# Patient Record
Sex: FEMALE | Race: WHITE | Employment: FULL TIME | URBAN - METROPOLITAN AREA
[De-identification: names, ages, dates, MRNs, and addresses within clinical notes are randomized per-mention and may not be internally consistent; named-entity substitution may affect disease eponyms.]

---

## 2017-02-24 ENCOUNTER — HOSPITAL ENCOUNTER (EMERGENCY)
Age: 47
Discharge: HOME | End: 2017-02-24
Attending: EMERGENCY MEDICINE
Payer: OTHER MISCELLANEOUS

## 2017-02-24 VITALS
DIASTOLIC BLOOD PRESSURE: 79 MMHG | OXYGEN SATURATION: 98 % | RESPIRATION RATE: 16 BRPM | TEMPERATURE: 98.2 F | HEART RATE: 84 BPM | HEIGHT: 63 IN | WEIGHT: 123 LBS | BODY MASS INDEX: 21.79 KG/M2 | SYSTOLIC BLOOD PRESSURE: 136 MMHG

## 2017-02-24 DIAGNOSIS — S80.02XA CONTUSION OF LEFT KNEE, INITIAL ENCOUNTER: ICD-10-CM

## 2017-02-24 DIAGNOSIS — S43.401A SPRAIN OF RIGHT SHOULDER, UNSPECIFIED SHOULDER SPRAIN TYPE, INITIAL ENCOUNTER: ICD-10-CM

## 2017-02-24 DIAGNOSIS — S63.501A SPRAIN OF RIGHT WRIST, INITIAL ENCOUNTER: Primary | ICD-10-CM

## 2017-02-24 DIAGNOSIS — S53.401A SPRAIN OF RIGHT ELBOW, INITIAL ENCOUNTER: ICD-10-CM

## 2017-02-24 PROCEDURE — 6370000000 HC RX 637 (ALT 250 FOR IP): Performed by: EMERGENCY MEDICINE

## 2017-02-24 PROCEDURE — 99283 EMERGENCY DEPT VISIT LOW MDM: CPT

## 2017-02-24 RX ORDER — IBUPROFEN 800 MG/1
800 TABLET ORAL ONCE
Status: COMPLETED | OUTPATIENT
Start: 2017-02-24 | End: 2017-02-24

## 2017-02-24 RX ORDER — CYCLOBENZAPRINE HCL 10 MG
5 TABLET ORAL 3 TIMES DAILY PRN
Qty: 21 TABLET | Refills: 0 | Status: SHIPPED | OUTPATIENT
Start: 2017-02-24 | End: 2017-03-03

## 2017-02-24 RX ORDER — IBUPROFEN 800 MG/1
800 TABLET ORAL EVERY 8 HOURS PRN
Qty: 20 TABLET | Refills: 0 | Status: SHIPPED | OUTPATIENT
Start: 2017-02-24

## 2017-02-24 RX ADMIN — IBUPROFEN 800 MG: 800 TABLET, FILM COATED ORAL at 23:26

## 2017-02-24 ASSESSMENT — PAIN SCALES - GENERAL
PAINLEVEL_OUTOF10: 5
PAINLEVEL_OUTOF10: 5

## 2017-02-24 ASSESSMENT — PAIN DESCRIPTION - DESCRIPTORS
DESCRIPTORS: DULL;ACHING
DESCRIPTORS_2: ACHING;DULL

## 2017-02-24 ASSESSMENT — PAIN DESCRIPTION - PAIN TYPE: TYPE: ACUTE PAIN

## 2017-02-24 ASSESSMENT — PAIN DESCRIPTION - DURATION: DURATION_2: CONTINUOUS

## 2017-02-24 ASSESSMENT — PAIN DESCRIPTION - INTENSITY: RATING_2: 5

## 2017-02-24 ASSESSMENT — PAIN DESCRIPTION - ORIENTATION
ORIENTATION: RIGHT
ORIENTATION_2: LEFT

## 2017-02-24 ASSESSMENT — PAIN SCALES - WONG BAKER: WONGBAKER_NUMERICALRESPONSE: 0

## 2017-02-24 ASSESSMENT — PAIN DESCRIPTION - LOCATION
LOCATION: WRIST;SHOULDER
LOCATION_2: LEG

## 2018-04-18 ENCOUNTER — OFFICE VISIT (OUTPATIENT)
Dept: FAMILY MEDICINE CLINIC | Age: 48
End: 2018-04-18
Payer: COMMERCIAL

## 2018-04-18 VITALS
DIASTOLIC BLOOD PRESSURE: 80 MMHG | HEIGHT: 63 IN | OXYGEN SATURATION: 100 % | WEIGHT: 132.2 LBS | SYSTOLIC BLOOD PRESSURE: 138 MMHG | RESPIRATION RATE: 18 BRPM | BODY MASS INDEX: 23.42 KG/M2 | HEART RATE: 74 BPM | TEMPERATURE: 98.8 F

## 2018-04-18 DIAGNOSIS — J01.90 ACUTE BACTERIAL SINUSITIS: Primary | ICD-10-CM

## 2018-04-18 DIAGNOSIS — B96.89 ACUTE BACTERIAL SINUSITIS: Primary | ICD-10-CM

## 2018-04-18 PROCEDURE — G8427 DOCREV CUR MEDS BY ELIG CLIN: HCPCS | Performed by: NURSE PRACTITIONER

## 2018-04-18 PROCEDURE — G8420 CALC BMI NORM PARAMETERS: HCPCS | Performed by: NURSE PRACTITIONER

## 2018-04-18 PROCEDURE — 99213 OFFICE O/P EST LOW 20 MIN: CPT | Performed by: NURSE PRACTITIONER

## 2018-04-18 PROCEDURE — 4004F PT TOBACCO SCREEN RCVD TLK: CPT | Performed by: NURSE PRACTITIONER

## 2018-04-18 RX ORDER — AMOXICILLIN AND CLAVULANATE POTASSIUM 875; 125 MG/1; MG/1
1 TABLET, FILM COATED ORAL 2 TIMES DAILY
Qty: 14 TABLET | Refills: 0 | Status: SHIPPED | OUTPATIENT
Start: 2018-04-18 | End: 2018-04-25

## 2018-04-18 RX ORDER — FLUCONAZOLE 150 MG/1
150 TABLET ORAL ONCE
Qty: 2 TABLET | Refills: 0 | Status: SHIPPED | OUTPATIENT
Start: 2018-04-18 | End: 2018-04-18

## 2018-04-18 RX ORDER — FLUTICASONE PROPIONATE 50 MCG
1 SPRAY, SUSPENSION (ML) NASAL DAILY
Qty: 1 BOTTLE | Refills: 0 | Status: SHIPPED | OUTPATIENT
Start: 2018-04-18 | End: 2018-09-16

## 2018-04-18 RX ORDER — TIZANIDINE 4 MG/1
4 TABLET ORAL
COMMUNITY
Start: 2015-06-09

## 2018-04-18 ASSESSMENT — ENCOUNTER SYMPTOMS
SWOLLEN GLANDS: 0
SORE THROAT: 1
RHINORRHEA: 1
DIARRHEA: 0
CONSTIPATION: 0
ABDOMINAL PAIN: 0
CHEST TIGHTNESS: 0
ABDOMINAL DISTENTION: 0
SHORTNESS OF BREATH: 0
WHEEZING: 0
NAUSEA: 0
ANAL BLEEDING: 0
COUGH: 1
HOARSE VOICE: 0
BLOOD IN STOOL: 0
VOMITING: 0
SINUS PRESSURE: 1

## 2018-08-29 ENCOUNTER — OFFICE VISIT (OUTPATIENT)
Dept: FAMILY MEDICINE CLINIC | Age: 48
End: 2018-08-29
Payer: COMMERCIAL

## 2018-08-29 VITALS
WEIGHT: 130.2 LBS | RESPIRATION RATE: 16 BRPM | HEIGHT: 63 IN | OXYGEN SATURATION: 98 % | TEMPERATURE: 97.1 F | BODY MASS INDEX: 23.07 KG/M2 | SYSTOLIC BLOOD PRESSURE: 122 MMHG | HEART RATE: 63 BPM | DIASTOLIC BLOOD PRESSURE: 80 MMHG

## 2018-08-29 DIAGNOSIS — B36.0 TINEA VERSICOLOR: Primary | ICD-10-CM

## 2018-08-29 DIAGNOSIS — L30.4 INTERTRIGO: ICD-10-CM

## 2018-08-29 PROCEDURE — G8420 CALC BMI NORM PARAMETERS: HCPCS | Performed by: NURSE PRACTITIONER

## 2018-08-29 PROCEDURE — 99213 OFFICE O/P EST LOW 20 MIN: CPT | Performed by: NURSE PRACTITIONER

## 2018-08-29 PROCEDURE — 4004F PT TOBACCO SCREEN RCVD TLK: CPT | Performed by: NURSE PRACTITIONER

## 2018-08-29 PROCEDURE — G8427 DOCREV CUR MEDS BY ELIG CLIN: HCPCS | Performed by: NURSE PRACTITIONER

## 2018-08-29 RX ORDER — NYSTATIN 100000 U/G
CREAM TOPICAL
Qty: 30 G | Refills: 2 | Status: SHIPPED | OUTPATIENT
Start: 2018-08-29 | End: 2019-12-21

## 2018-08-29 RX ORDER — SELENIUM SULFIDE 2.5 MG/100ML
LOTION TOPICAL
Qty: 118 ML | Refills: 5 | Status: SHIPPED | OUTPATIENT
Start: 2018-08-29 | End: 2019-12-21

## 2018-08-29 ASSESSMENT — ENCOUNTER SYMPTOMS
SORE THROAT: 0
NAUSEA: 0
EYE PAIN: 0
VOMITING: 0
EYE ITCHING: 0
COUGH: 0
RHINORRHEA: 0
SHORTNESS OF BREATH: 0
EYE DISCHARGE: 0
PHOTOPHOBIA: 0
NAIL CHANGES: 0
TROUBLE SWALLOWING: 0
EYE REDNESS: 0
DIARRHEA: 0
ABDOMINAL PAIN: 0
SINUS PRESSURE: 0

## 2018-08-29 NOTE — PROGRESS NOTES
has no wheezes. She has no rales. Musculoskeletal: Normal range of motion. She exhibits no edema. Neurological: She is alert and oriented to person, place, and time. Skin: Skin is warm and dry. Rash noted. Rash is macular and papular. She is not diaphoretic. Psychiatric: She has a normal mood and affect. Her behavior is normal.   Vitals reviewed. Assessment and Plan      ICD-10-CM ICD-9-CM    1. Tinea versicolor B36.0 111.0 selenium sulfide (SELSUN) 2.5 % lotion   2. Intertrigo L30.4 695.89 nystatin (MYCOSTATIN) 977138 UNIT/GM cream     Orders Placed This Encounter   Medications    selenium sulfide (SELSUN) 2.5 % lotion     Sig: Apply for 10 minutes on skin daily for 1-4 weeks OR one overnight application. Dispense:  118 mL     Refill:  5    nystatin (MYCOSTATIN) 865652 UNIT/GM cream     Sig: Apply topically 2 times daily. Dispense:  30 g     Refill:  2       Health Maintenance  Patient declined PPSV23 during this visit    Reviewed with the patient: current clinical status, medications, activities and diet. Patient advised to practice proper hand hygiene, rest as tolerated, and increase hydration. Patient advised to take medication as directed. Advised to eat a well balanced diet. Advised to use Tylenol/NSAIDs as needed for symptom management. 1. Recommend hypoallergenic body soap and lotion (Cetaphil, CeraVe or Dove for Sensitive Skin). 2. Apply topical selsun as directed for symptom management. 3. Advised to use nystatin as directed for skin fold rash. 4. Choose laundry detergents and fabric softeners that are hypoallergenic (Tide Free or All Free & Clear)--NO ADDED DYES OR PERFUMES. Side effects, adverse effects of the medication prescribed today, as well as treatment plan and result expectations have been discussed with the patient who expresses understanding and desires to proceed with recommended treatment and action plan.      Close follow up to evaluate treatment results and for coordination of care. I have reviewed the patient's medical history in detail and updated the computerized patient record. Patient instructed to follow-up with her PCP or proceed to ER if symptoms are not resolved, persist, or worsen despite medical treatment plan initiated at today's visit.          Kori Schaffer, APRN - CNP

## 2018-08-29 NOTE — PATIENT INSTRUCTIONS
can help relieve the discomfort caused by a rash. A moisturizing lotion, such as Cetaphil, also may help. Calamine lotion may help for rashes caused by contact with something (such as a plant or soap) that irritated the skin. Use it 3 or 4 times a day. · If your doctor prescribed a cream, use it as directed. If your doctor prescribed medicine, take it exactly as directed. · If your rash itches so badly that it interferes with your normal activities, take an over-the-counter antihistamine, such as diphenhydramine (Benadryl) or loratadine (Claritin). Read and follow all instructions on the label. When should you call for help? Call your doctor now or seek immediate medical care if:    · You have signs of infection, such as:  ¨ Increased pain, swelling, warmth, or redness. ¨ Red streaks leading from the area. ¨ Pus draining from the area. ¨ A fever.     · You have joint pain along with the rash.    Watch closely for changes in your health, and be sure to contact your doctor if:    · Your rash is changing or getting worse. For example, call if you have pain along with the rash, the rash is spreading, or you have new blisters.     · You do not get better after 1 week. Where can you learn more? Go to [email or web address]?.[email or web address] org and sign in to your Student[employer]under account. Enter V307 in the [facility] box to learn more about \"Rash: Care Instructions. \"     If you do not have an account, please click on the \"Sign Up Now\" link. Current as of: October 5, 2017  Content Version: 11.7  © 5541-9262 [employer], [employer]. Care instructions adapted under license by [facility] ([facility]). If you have questions about a medical condition or this instruction, always ask your healthcare professional. [address] any warranty or liability for your use of this information.

## 2018-09-16 ENCOUNTER — OFFICE VISIT (OUTPATIENT)
Dept: FAMILY MEDICINE CLINIC | Age: 48
End: 2018-09-16
Payer: COMMERCIAL

## 2018-09-16 VITALS
WEIGHT: 132 LBS | SYSTOLIC BLOOD PRESSURE: 110 MMHG | DIASTOLIC BLOOD PRESSURE: 80 MMHG | BODY MASS INDEX: 23.38 KG/M2 | HEART RATE: 73 BPM | TEMPERATURE: 98.2 F | OXYGEN SATURATION: 98 %

## 2018-09-16 DIAGNOSIS — B96.89 BACTERIAL VAGINOSIS: Primary | ICD-10-CM

## 2018-09-16 DIAGNOSIS — T36.95XA ANTIBIOTIC-INDUCED YEAST INFECTION: ICD-10-CM

## 2018-09-16 DIAGNOSIS — N76.0 BACTERIAL VAGINOSIS: Primary | ICD-10-CM

## 2018-09-16 DIAGNOSIS — B37.9 ANTIBIOTIC-INDUCED YEAST INFECTION: ICD-10-CM

## 2018-09-16 DIAGNOSIS — R39.15 URGENCY OF URINATION: ICD-10-CM

## 2018-09-16 LAB
BILIRUBIN, POC: NORMAL
BLOOD URINE, POC: NORMAL
CLARITY, POC: NORMAL
COLOR, POC: YELLOW
GLUCOSE URINE, POC: NORMAL
KETONES, POC: NORMAL
LEUKOCYTE EST, POC: NORMAL
NITRITE, POC: NORMAL
PH, POC: 6.5
PROTEIN, POC: NORMAL
SPECIFIC GRAVITY, POC: 1.01
UROBILINOGEN, POC: NORMAL

## 2018-09-16 PROCEDURE — G8427 DOCREV CUR MEDS BY ELIG CLIN: HCPCS | Performed by: NURSE PRACTITIONER

## 2018-09-16 PROCEDURE — G8420 CALC BMI NORM PARAMETERS: HCPCS | Performed by: NURSE PRACTITIONER

## 2018-09-16 PROCEDURE — 81002 URINALYSIS NONAUTO W/O SCOPE: CPT | Performed by: NURSE PRACTITIONER

## 2018-09-16 PROCEDURE — 99213 OFFICE O/P EST LOW 20 MIN: CPT | Performed by: NURSE PRACTITIONER

## 2018-09-16 PROCEDURE — 4004F PT TOBACCO SCREEN RCVD TLK: CPT | Performed by: NURSE PRACTITIONER

## 2018-09-16 RX ORDER — FLUCONAZOLE 150 MG/1
150 TABLET ORAL ONCE
Qty: 2 TABLET | Refills: 0 | Status: SHIPPED | OUTPATIENT
Start: 2018-09-16 | End: 2018-09-16

## 2018-09-16 RX ORDER — METRONIDAZOLE 500 MG/1
500 TABLET ORAL 2 TIMES DAILY
Qty: 14 TABLET | Refills: 0 | Status: SHIPPED | OUTPATIENT
Start: 2018-09-16 | End: 2018-09-23

## 2018-09-16 ASSESSMENT — ENCOUNTER SYMPTOMS
SORE THROAT: 0
SHORTNESS OF BREATH: 0
ABDOMINAL PAIN: 0
NAUSEA: 0
CHEST TIGHTNESS: 0

## 2018-09-16 NOTE — PATIENT INSTRUCTIONS
unexpected vaginal bleeding.     · You have a fever.     · You have new or increased pain in your vagina or pelvis.     · You are not getting better after 1 week.     · Your symptoms return after you finish the course of your medicine. Where can you learn more? Go to [email or web address] [email or web address] and sign in to your [employer] account. Enter A455 in the [employer] box to learn more about \"Bacterial Vaginosis: Care Instructions. \"     If you do not have an account, please click on the \"Sign Up Now\" link. Current as of: October 6, 2017  Content Version: 11.7  © 6181-6974 [employer]. Care instructions adapted under license by [facility] ([facility]). If you have questions about a medical condition or this instruction, always ask your healthcare professional. [address] any warranty or liability for your use of this information.

## 2018-09-16 NOTE — PROGRESS NOTES
History Narrative    No narrative on file     No family history on file. Allergies   Allergen Reactions    Latex Anaphylaxis     BURNING OF SKIN    Adhesive Tape      BURNING OF SKIN    Ciprofloxacin     Codeine     Demerol Hcl [Meperidine]     Tetracyclines & Related      Current Outpatient Prescriptions   Medication Sig Dispense Refill    metroNIDAZOLE (FLAGYL) 500 MG tablet Take 1 tablet by mouth 2 times daily for 7 days To treat BV: 500mg bid x 7  To treat trich: give 500 mg x 4 at once (2g) 14 tablet 0    fluconazole (DIFLUCAN) 150 MG tablet Take 1 tablet by mouth once for 1 dose 2 tablet 0    selenium sulfide (SELSUN) 2.5 % lotion Apply for 10 minutes on skin daily for 1-4 weeks OR one overnight application. 118 mL 5    nystatin (MYCOSTATIN) 389887 UNIT/GM cream Apply topically 2 times daily. 30 g 2    tiZANidine (ZANAFLEX) 4 MG tablet Take 4 mg by mouth      atenolol (TENORMIN) 50 MG tablet Take 50 mg by mouth daily      amLODIPine (NORVASC) 2.5 MG tablet Take 2.5 mg by mouth daily      ibuprofen (ADVIL;MOTRIN) 800 MG tablet Take 1 tablet by mouth every 8 hours as needed for Pain or Fever 20 tablet 0    ibuprofen (ADVIL;MOTRIN) 800 MG tablet Take 800 mg by mouth every 6 hours as needed for Pain       No current facility-administered medications for this visit. PMH, Surgical Hx, Family Hx, and Social Hx reviewed and updated. Health Maintenance reviewed. Objective    Vitals:    09/16/18 1601   BP: 110/80   Site: Right Upper Arm   Position: Sitting   Cuff Size: Medium Adult   Pulse: 73   Temp: 98.2 °F (36.8 °C)   TempSrc: Tympanic   SpO2: 98%   Weight: 132 lb (59.9 kg)       Physical Exam   Constitutional: She is oriented to person, place, and time. She appears well-developed and well-nourished. No distress. HENT:   Head: Normocephalic and atraumatic. Mouth/Throat: Oropharynx is clear and moist.   Eyes: Conjunctivae are normal. Right eye exhibits no discharge.  Left eye exhibits no discharge. Neck: Normal range of motion. Cardiovascular: Normal rate. Pulmonary/Chest: Effort normal.   Genitourinary:   Genitourinary Comments: Deferred. Musculoskeletal: Normal range of motion. Neurological: She is alert and oriented to person, place, and time. Skin: Skin is warm and dry. No rash noted. She is not diaphoretic. Psychiatric: She has a normal mood and affect. Her behavior is normal. Judgment and thought content normal.   Nursing note and vitals reviewed. Results for POC orders placed in visit on 09/16/18   POCT Urinalysis no Micro   Result Value Ref Range    Color, UA yellow     Clarity, UA cloudy     Glucose, UA POC neg     Bilirubin, UA neg     Ketones, UA neg     Spec Grav, UA 1.010     Blood, UA POC neg     pH, UA 6.5     Protein, UA POC neg     Urobilinogen, UA neg     Leukocytes, UA neg     Nitrite, UA neg          No results found for this visit on 09/16/18. Assessment & Plan    Diagnosis Orders   1. Bacterial vaginosis  metroNIDAZOLE (FLAGYL) 500 MG tablet   2. Antibiotic-induced yeast infection  fluconazole (DIFLUCAN) 150 MG tablet   3. Urgency of urination  POCT Urinalysis no Micro    Urine Culture     Orders Placed This Encounter   Procedures    Urine Culture     Standing Status:   Future     Standing Expiration Date:   9/16/2019     Order Specific Question:   Specify (ex-cath, midstream, cysto, etc)?      Answer:   mid stream    POCT Urinalysis no Micro     Orders Placed This Encounter   Medications    metroNIDAZOLE (FLAGYL) 500 MG tablet     Sig: Take 1 tablet by mouth 2 times daily for 7 days To treat BV: 500mg bid x 7  To treat trich: give 500 mg x 4 at once (2g)     Dispense:  14 tablet     Refill:  0    fluconazole (DIFLUCAN) 150 MG tablet     Sig: Take 1 tablet by mouth once for 1 dose     Dispense:  2 tablet     Refill:  0     Medications Discontinued During This Encounter   Medication Reason    fluticasone (FLONASE) 50 MCG/ACT nasal spray    

## 2018-09-17 DIAGNOSIS — R39.15 URGENCY OF URINATION: ICD-10-CM

## 2018-09-19 LAB — URINE CULTURE, ROUTINE: NORMAL

## 2018-10-15 ENCOUNTER — OFFICE VISIT (OUTPATIENT)
Dept: FAMILY MEDICINE CLINIC | Age: 48
End: 2018-10-15
Payer: COMMERCIAL

## 2018-10-15 VITALS
DIASTOLIC BLOOD PRESSURE: 64 MMHG | RESPIRATION RATE: 14 BRPM | TEMPERATURE: 97.6 F | SYSTOLIC BLOOD PRESSURE: 118 MMHG | HEART RATE: 71 BPM | BODY MASS INDEX: 23.39 KG/M2 | WEIGHT: 132 LBS | OXYGEN SATURATION: 99 % | HEIGHT: 63 IN

## 2018-10-15 DIAGNOSIS — J01.00 ACUTE NON-RECURRENT MAXILLARY SINUSITIS: Primary | ICD-10-CM

## 2018-10-15 DIAGNOSIS — B96.89 BACTERIAL VAGINOSIS: ICD-10-CM

## 2018-10-15 DIAGNOSIS — N76.0 BACTERIAL VAGINOSIS: ICD-10-CM

## 2018-10-15 PROBLEM — J38.2 VOCAL CORD NODULE: Status: ACTIVE | Noted: 2018-08-07

## 2018-10-15 PROBLEM — R49.0 DYSPHONIA: Status: ACTIVE | Noted: 2018-08-07

## 2018-10-15 PROBLEM — M25.562 ACUTE PAIN OF LEFT KNEE: Status: ACTIVE | Noted: 2017-03-14

## 2018-10-15 PROCEDURE — G8427 DOCREV CUR MEDS BY ELIG CLIN: HCPCS | Performed by: NURSE PRACTITIONER

## 2018-10-15 PROCEDURE — 4004F PT TOBACCO SCREEN RCVD TLK: CPT | Performed by: NURSE PRACTITIONER

## 2018-10-15 PROCEDURE — 99213 OFFICE O/P EST LOW 20 MIN: CPT | Performed by: NURSE PRACTITIONER

## 2018-10-15 PROCEDURE — G8484 FLU IMMUNIZE NO ADMIN: HCPCS | Performed by: NURSE PRACTITIONER

## 2018-10-15 PROCEDURE — G8420 CALC BMI NORM PARAMETERS: HCPCS | Performed by: NURSE PRACTITIONER

## 2018-10-15 RX ORDER — CEFDINIR 300 MG/1
600 CAPSULE ORAL DAILY
Qty: 20 CAPSULE | Refills: 0 | Status: SHIPPED | OUTPATIENT
Start: 2018-10-15 | End: 2018-10-25

## 2018-10-15 RX ORDER — METRONIDAZOLE 500 MG/1
TABLET ORAL
Qty: 14 TABLET | Refills: 0 | Status: SHIPPED | OUTPATIENT
Start: 2018-10-15 | End: 2019-05-29

## 2018-10-15 RX ORDER — METRONIDAZOLE 500 MG/1
TABLET ORAL
Refills: 0 | COMMUNITY
Start: 2018-09-16 | End: 2018-10-15 | Stop reason: SDUPTHER

## 2018-10-15 ASSESSMENT — ENCOUNTER SYMPTOMS
VOMITING: 0
SINUS PRESSURE: 1
EYE REDNESS: 0
CONSTIPATION: 0
ABDOMINAL PAIN: 0
SINUS PAIN: 1
COUGH: 1
RHINORRHEA: 1
BACK PAIN: 0
WHEEZING: 0
FACIAL SWELLING: 0
SWOLLEN GLANDS: 0
STRIDOR: 0
EYE PAIN: 0
NAUSEA: 0
TROUBLE SWALLOWING: 0
DIARRHEA: 0
SORE THROAT: 1
SHORTNESS OF BREATH: 0
VOICE CHANGE: 1
EYE ITCHING: 0
CHEST TIGHTNESS: 0
PHOTOPHOBIA: 0
EYE DISCHARGE: 0

## 2018-10-15 ASSESSMENT — PATIENT HEALTH QUESTIONNAIRE - PHQ9
SUM OF ALL RESPONSES TO PHQ QUESTIONS 1-9: 0
SUM OF ALL RESPONSES TO PHQ9 QUESTIONS 1 & 2: 0
SUM OF ALL RESPONSES TO PHQ QUESTIONS 1-9: 0
2. FEELING DOWN, DEPRESSED OR HOPELESS: 0
1. LITTLE INTEREST OR PLEASURE IN DOING THINGS: 0

## 2018-10-15 NOTE — LETTER
[address]  [facility], [address]  [address] [place] [address]  Phone: [phone]  Fax: [phone] MUSA Denis - [place]        October 15, 2018     Patient: Yonas Tillman   YOB: 1970   Date of Visit: 10/15/2018       To Whom it May Concern:    Kel Blue was seen in my clinic on 10/15/2018. She can return to work on 10/17/2018. If you have any questions or concerns, please don't hesitate to call.     Sincerely,         MUSA Carrion NP

## 2018-10-15 NOTE — PROGRESS NOTES
swelling, mouth sores, sneezing, tinnitus and trouble swallowing. Eyes: Negative for photophobia, pain, discharge, redness and itching. Respiratory: Positive for cough (yellow sputum). Negative for chest tightness, shortness of breath, wheezing and stridor. Cardiovascular: Negative for chest pain. Gastrointestinal: Negative for abdominal pain, anorexia, constipation, diarrhea, nausea and vomiting. Genitourinary: Negative for difficulty urinating, dyspareunia, dysuria, flank pain, frequency, hematuria, pelvic pain, urgency, vaginal bleeding, vaginal discharge and vaginal pain. Musculoskeletal: Negative for back pain, joint pain and neck pain. Skin: Negative for rash. Allergic/Immunologic: Negative for environmental allergies. Neurological: Positive for headaches. Objective    Vitals:    10/15/18 1654   BP: 118/64   Site: Left Upper Arm   Position: Sitting   Cuff Size: Medium Adult   Pulse: 71   Resp: 14   Temp: 97.6 °F (36.4 °C)   TempSrc: Tympanic   SpO2: 99%   Weight: 132 lb (59.9 kg)   Height: 5' 3\" (1.6 m)       Physical Exam   Constitutional: She is oriented to person, place, and time. She appears well-developed and well-nourished. No distress. HENT:   Head: Normocephalic and atraumatic. Right Ear: Hearing, tympanic membrane, external ear and ear canal normal. No drainage, swelling or tenderness. No foreign bodies. No mastoid tenderness. Tympanic membrane is not perforated, not erythematous, not retracted and not bulging. No middle ear effusion. No decreased hearing is noted. Left Ear: Hearing, tympanic membrane, external ear and ear canal normal. No drainage, swelling or tenderness. No foreign bodies. No mastoid tenderness. Tympanic membrane is not perforated, not erythematous, not retracted and not bulging. No middle ear effusion. No decreased hearing is noted. Nose: Rhinorrhea present. Right sinus exhibits maxillary sinus tenderness.  Right sinus exhibits no frontal sinus tenderness. Left sinus exhibits maxillary sinus tenderness. Left sinus exhibits no frontal sinus tenderness. Mouth/Throat: Uvula is midline and mucous membranes are normal. No oral lesions. Posterior oropharyngeal erythema present. No oropharyngeal exudate or posterior oropharyngeal edema. Eyes: Conjunctivae and EOM are normal. Right eye exhibits no discharge. Left eye exhibits no discharge. Neck: Normal range of motion. Neck supple. Cardiovascular: Normal rate, regular rhythm and normal heart sounds. Pulmonary/Chest: Effort normal and breath sounds normal. No respiratory distress. She has no wheezes. She has no rales. Musculoskeletal: Normal range of motion. Lymphadenopathy:     She has no cervical adenopathy. Neurological: She is alert and oriented to person, place, and time. Coordination normal.   Skin: Skin is warm and dry. No rash noted. She is not diaphoretic. Psychiatric: She has a normal mood and affect. Her behavior is normal.   Nursing note and vitals reviewed. POC Testing Today: No results found for this visit on 10/15/18. Assessment & Plan    Diagnosis Orders   1. Acute non-recurrent maxillary sinusitis  cefdinir (OMNICEF) 300 MG capsule   2. Bacterial vaginosis  metroNIDAZOLE (FLAGYL) 500 MG tablet       No orders of the defined types were placed in this encounter. Jimbo Elizondo states she has frequent episodes of BV due to her work in a [employer] in which she has to wear a fireproof suit. States her previous OBGYN, Dr. Barb Corrales, tested her for STDs in the past and it was always negative. Has no concerns regarding STDs today. Discussed with Jimbo Elizondo that if symptoms do not resolve after this second round of treatment, she needs to have vaginal swabs repeated. Verbalized understanding. Antibiotic Instructions: Complete the full course of antibiotics as ordered. Take each dose with a small snack or meal to lessen potential GI upset.   To prevent antibiotic

## 2019-05-29 ENCOUNTER — OFFICE VISIT (OUTPATIENT)
Dept: FAMILY MEDICINE CLINIC | Age: 49
End: 2019-05-29
Payer: COMMERCIAL

## 2019-05-29 VITALS
HEART RATE: 91 BPM | TEMPERATURE: 98.5 F | RESPIRATION RATE: 14 BRPM | BODY MASS INDEX: 24.17 KG/M2 | SYSTOLIC BLOOD PRESSURE: 128 MMHG | OXYGEN SATURATION: 99 % | HEIGHT: 63 IN | WEIGHT: 136.4 LBS | DIASTOLIC BLOOD PRESSURE: 68 MMHG

## 2019-05-29 DIAGNOSIS — B37.9 ANTIBIOTIC-INDUCED YEAST INFECTION: ICD-10-CM

## 2019-05-29 DIAGNOSIS — N89.8 VAGINAL DISCHARGE: Primary | ICD-10-CM

## 2019-05-29 DIAGNOSIS — T36.95XA ANTIBIOTIC-INDUCED YEAST INFECTION: ICD-10-CM

## 2019-05-29 PROCEDURE — 99212 OFFICE O/P EST SF 10 MIN: CPT | Performed by: NURSE PRACTITIONER

## 2019-05-29 RX ORDER — RANITIDINE 150 MG/1
150 TABLET ORAL
COMMUNITY
Start: 2019-05-11 | End: 2019-12-21

## 2019-05-29 RX ORDER — ALPRAZOLAM 0.25 MG/1
TABLET ORAL
Refills: 0 | COMMUNITY
Start: 2019-05-12

## 2019-05-29 RX ORDER — METRONIDAZOLE 500 MG/1
500 TABLET ORAL 2 TIMES DAILY
Qty: 14 TABLET | Refills: 0 | Status: SHIPPED | OUTPATIENT
Start: 2019-05-29 | End: 2019-06-05

## 2019-05-29 RX ORDER — FLUCONAZOLE 150 MG/1
TABLET ORAL
Qty: 2 TABLET | Refills: 0 | Status: SHIPPED | OUTPATIENT
Start: 2019-05-29 | End: 2019-12-21

## 2019-05-29 NOTE — PATIENT INSTRUCTIONS
Call or return if symptoms don't improve in 2-3 days or worsen in any wy      Antibiotic Instructions: Complete the full course of antibiotics as ordered. Take each dose with a small snack or meal to lessen potential GI upset. To prevent antibiotic resistance, please take medication as ordered and for the full duration even if you start to feel better. Consider intake of yogurt or probiotic during antibiotic use and for a few days after to help reduce the risk of developing a secondary infection. Separate the yogurt and antibiotic by at least 1 hour. Avoid alcohol while taking antibiotics. Patient Education   Bacterial Vaginosis: Care Instructions  Your Care Instructions    Bacterial vaginosis is a type of vaginal infection. It is caused by excess growth of certain bacteria that are normally found in the vagina. Symptoms can include itching, swelling, pain when you urinate or have sex, and a gray or yellow discharge with a \"fishy\" odor. It is not considered an infection that is spread through sexual contact. Although symptoms can be annoying and uncomfortable, bacterial vaginosis does not usually cause other health problems. However, if you have it while you are pregnant, it can cause complications. While the infection may go away on its own, most doctors use antibiotics to treat it. You may have been prescribed pills or vaginal cream. With treatment, bacterial vaginosis usually clears up in 5 to 7 days. Follow-up care is a key part of your treatment and safety. Be sure to make and go to all appointments, and call your doctor if you are having problems. It's also a good idea to know your test results and keep a list of the medicines you take. How can you care for yourself at home? · Take your antibiotics as directed. Do not stop taking them just because you feel better. You need to take the full course of antibiotics.   · Do not eat or drink anything that contains alcohol if you are taking metronidazole (Flagyl). · Keep using your medicine if you start your period. Use pads instead of tampons while using a vaginal cream or suppository. Tampons can absorb the medicine. · Wear loose cotton clothing. Do not wear nylon and other materials that hold body heat and moisture close to the skin. · Do not scratch. Relieve itching with a cold pack or a cool bath. · Do not wash your vaginal area more than once a day. Use plain water or a mild, unscented soap. Do not douche. When should you call for help? Watch closely for changes in your health, and be sure to contact your doctor if:    · You have unexpected vaginal bleeding.     · You have a fever.     · You have new or increased pain in your vagina or pelvis.     · You are not getting better after 1 week.     · Your symptoms return after you finish the course of your medicine. Where can you learn more? Go to [email or web address]://[email or web address] Payments.[email or web address] org and sign in to your [employer] account. Enter F586 in the [employer] box to learn more about \"Bacterial Vaginosis: Care Instructions. \"     If you do not have an account, please click on the \"Sign Up Now\" link. Current as of: May 14, 2018  Content Version: 12.0  © 1763-3051 Healthwise, Incorporated. Care instructions adapted under license by [facility] ([facility]). If you have questions about a medical condition or this instruction, always ask your healthcare professional. [place] [address] any warranty or liability for your use of this information.

## 2019-05-29 NOTE — PROGRESS NOTES
Subjective  Yanetjennifer Johnson  Chief Complaint   Patient presents with    Vaginal Discharge     odor after sexual intercourse     47 y.o. female presents w/ complaint of vaginal discharge. Onset a few days ago - after intercourse. She describes it as mostly clear and thin, moderate amount. States it does have a fishy odor. Denies associated pain. Denies dysuria, fever, chills, or pain w/ intercourse. States symptoms are similar to past BV infections. She has one male partner - , no condom use. Denies anal intercourse or concern for STI. 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 Review of Systems   HENT: Negative for voice change (raspy/hoarse voice, not new). Gastrointestinal: Negative for abdominal pain, diarrhea, nausea and vomiting. Genitourinary: Positive for vaginal discharge. Negative for decreased urine volume, dysuria, flank pain, genital sores, pelvic pain, urgency, vaginal bleeding and vaginal pain. Musculoskeletal: Negative for back pain. Skin: Negative. Objective  Vitals:    05/29/19 1755   BP: 128/68   Site: Left Upper Arm   Position: Sitting   Cuff Size: Medium Adult   Pulse: 91   Resp: 14   Temp: 98.5 °F (36.9 °C)   TempSrc: Tympanic   SpO2: 99%   Weight: 136 lb 6.4 oz (61.9 kg)   Height: 5' 3\" (1.6 m)     Physical Exam   Constitutional: She is oriented to person, place, and time. She appears well-developed and well-nourished. She is cooperative. Non-toxic appearance. No distress. HENT:   Head: Normocephalic and atraumatic. Right Ear: Hearing and external ear normal.   Left Ear: Hearing and external ear normal.   Nose: Nose normal.   Mouth/Throat: Oropharynx is clear and moist and mucous membranes are normal.   Eyes: Pupils are equal, round, and reactive to light. EOM and lids are normal.   Neck: Normal range of motion. Neck supple. No tracheal tenderness present. No tracheal deviation present.    Cardiovascular: Normal rate, S1 normal and S2 normal.   Pulmonary/Chest: Effort normal. She has no wheezes. She has no rales. Abdominal: Soft. Normal appearance and bowel sounds are normal. She exhibits no distension. There is no hepatosplenomegaly. There is no tenderness. There is no guarding and no CVA tenderness. No hernia. Genitourinary:   Genitourinary Comments: exam declined and deferred   Musculoskeletal: Normal range of motion. Neurological: She is alert and oriented to person, place, and time. Gait normal.   Skin: Skin is warm and dry. Capillary refill takes less than 2 seconds. Psychiatric: She has a normal mood and affect. Her speech is normal and behavior is normal.   Vitals reviewed. POC Testing Today: None    Assessment & Plan   47 y.o. female presents w/ vaginal discharge similar in character to previous bacterial vaginosis infection(s). Pelvic exam and specimen collection declined and deferred, will treat based on symptoms. Pt has reliable follow-up and demonstrates good insight re: similarity of symptoms to previous episodes and absence of new/unusual gu/gyn symptoms. Diagnoses and all orders for this visit:    Vaginal discharge  -     metroNIDAZOLE (FLAGYL) 500 MG tablet; Take 1 tablet by mouth 2 times daily for 7 days  - Do not resume intercourse until symptoms have resolved/treatment complete  - Return or see PCP if symptoms do not improve as anticipated or worsen in any way -  pelvic pain/cramping, fever/chills, vaginal bleeding, urinary symptoms, abd. pain/bloating    Antibiotic-induced yeast infection  -     fluconazole (DIFLUCAN) 150 MG tablet; 1 PO STAT; repeat 1 PO in 72 hours. Antibiotic Instructions: Complete the full course of antibiotics as ordered. Take each dose with a small snack or meal to lessen potential GI upset. To prevent antibiotic resistance, please take medication as ordered and for the full duration even if you start to feel better.  Consider intake of yogurt or probiotic during antibiotic use and for a few days after to help reduce the risk of developing a secondary infection. Separate the yogurt and antibiotic by at least 1 hour. Avoid alcohol while taking antibiotics. Side effects and adverse effects of any medication prescribed today, as well as treatment plan/rationale, follow-up care, and result expectations have been discussed with the patient. Artie Maldonado expresses understanding and wishes to proceed with the plan. Discussed signs and symptoms which require immediate follow-up in ED/call to 911. Understanding verbalized. I have reviewed and updated the electronic medical record.     Quita Rinne, APRN - CNP

## 2019-06-15 ASSESSMENT — ENCOUNTER SYMPTOMS
DIARRHEA: 0
NAUSEA: 0
VOMITING: 0
BACK PAIN: 0
ABDOMINAL PAIN: 0
VOICE CHANGE: 0

## 2019-08-03 ENCOUNTER — HOSPITAL ENCOUNTER (OUTPATIENT)
Dept: WOMENS IMAGING | Age: 49
Discharge: HOME | End: 2019-08-05
Payer: COMMERCIAL

## 2019-08-03 ENCOUNTER — HOSPITAL ENCOUNTER (OUTPATIENT)
Dept: ULTRASOUND IMAGING | Age: 49
Discharge: HOME | End: 2019-08-05
Payer: COMMERCIAL

## 2019-08-03 DIAGNOSIS — R68.89 NECK SYMPTOM: ICD-10-CM

## 2019-08-03 DIAGNOSIS — R13.10 DYSPHAGIA, UNSPECIFIED TYPE: ICD-10-CM

## 2019-08-03 DIAGNOSIS — J38.3 SPASMODIC DYSPHONIA: ICD-10-CM

## 2019-08-03 DIAGNOSIS — Z12.31 ENCOUNTER FOR SCREENING MAMMOGRAM FOR BREAST CANCER: ICD-10-CM

## 2019-08-03 DIAGNOSIS — R49.0 DYSPHONIA: ICD-10-CM

## 2019-08-03 PROCEDURE — 76536 US EXAM OF HEAD AND NECK: CPT

## 2019-08-03 PROCEDURE — 77063 BREAST TOMOSYNTHESIS BI: CPT

## 2019-12-21 ENCOUNTER — OFFICE VISIT (OUTPATIENT)
Dept: FAMILY MEDICINE CLINIC | Age: 49
End: 2019-12-21
Payer: COMMERCIAL

## 2019-12-21 VITALS
TEMPERATURE: 96.8 F | OXYGEN SATURATION: 99 % | RESPIRATION RATE: 15 BRPM | HEIGHT: 62 IN | SYSTOLIC BLOOD PRESSURE: 112 MMHG | DIASTOLIC BLOOD PRESSURE: 82 MMHG | WEIGHT: 137.2 LBS | HEART RATE: 83 BPM | BODY MASS INDEX: 25.25 KG/M2

## 2019-12-21 DIAGNOSIS — B96.89 BV (BACTERIAL VAGINOSIS): Primary | ICD-10-CM

## 2019-12-21 DIAGNOSIS — R30.0 DYSURIA: ICD-10-CM

## 2019-12-21 DIAGNOSIS — N76.0 BV (BACTERIAL VAGINOSIS): Primary | ICD-10-CM

## 2019-12-21 LAB
BILIRUBIN, POC: ABNORMAL
BLOOD URINE, POC: ABNORMAL
CLARITY, POC: CLEAR
COLOR, POC: YELLOW
GLUCOSE URINE, POC: ABNORMAL
KETONES, POC: ABNORMAL
LEUKOCYTE EST, POC: ABNORMAL
NITRITE, POC: ABNORMAL
PH, POC: 6
PROTEIN, POC: ABNORMAL
SPECIFIC GRAVITY, POC: 1.02
UROBILINOGEN, POC: ABNORMAL

## 2019-12-21 PROCEDURE — 81003 URINALYSIS AUTO W/O SCOPE: CPT | Performed by: NURSE PRACTITIONER

## 2019-12-21 PROCEDURE — 99213 OFFICE O/P EST LOW 20 MIN: CPT | Performed by: NURSE PRACTITIONER

## 2019-12-21 RX ORDER — METRONIDAZOLE 500 MG/1
500 TABLET ORAL 2 TIMES DAILY
Qty: 14 TABLET | Refills: 0 | Status: SHIPPED | OUTPATIENT
Start: 2019-12-21 | End: 2019-12-28

## 2019-12-21 RX ORDER — NITROFURANTOIN 25; 75 MG/1; MG/1
100 CAPSULE ORAL 2 TIMES DAILY
Qty: 10 CAPSULE | Refills: 0 | Status: SHIPPED | OUTPATIENT
Start: 2019-12-21 | End: 2019-12-26

## 2019-12-23 LAB — URINE CULTURE, ROUTINE: NORMAL

## 2020-03-05 ENCOUNTER — HOSPITAL ENCOUNTER (EMERGENCY)
Age: 50
Discharge: HOME | End: 2020-03-05
Attending: EMERGENCY MEDICINE
Payer: COMMERCIAL

## 2020-03-05 VITALS
BODY MASS INDEX: 23.39 KG/M2 | HEART RATE: 57 BPM | DIASTOLIC BLOOD PRESSURE: 92 MMHG | RESPIRATION RATE: 17 BRPM | HEIGHT: 63 IN | OXYGEN SATURATION: 100 % | TEMPERATURE: 97.4 F | SYSTOLIC BLOOD PRESSURE: 159 MMHG | WEIGHT: 132 LBS

## 2020-03-05 PROCEDURE — 2500000003 HC RX 250 WO HCPCS: Performed by: EMERGENCY MEDICINE

## 2020-03-05 PROCEDURE — 99282 EMERGENCY DEPT VISIT SF MDM: CPT

## 2020-03-05 PROCEDURE — 6370000000 HC RX 637 (ALT 250 FOR IP): Performed by: EMERGENCY MEDICINE

## 2020-03-05 RX ORDER — TOBRAMYCIN AND DEXAMETHASONE 3; 1 MG/ML; MG/ML
1 SUSPENSION/ DROPS OPHTHALMIC
Qty: 1 BOTTLE | Refills: 0 | Status: SHIPPED | OUTPATIENT
Start: 2020-03-05 | End: 2020-03-15

## 2020-03-05 RX ORDER — PROPARACAINE HYDROCHLORIDE 5 MG/ML
2 SOLUTION/ DROPS OPHTHALMIC ONCE
Status: COMPLETED | OUTPATIENT
Start: 2020-03-05 | End: 2020-03-05

## 2020-03-05 RX ORDER — OXYCODONE HYDROCHLORIDE AND ACETAMINOPHEN 5; 325 MG/1; MG/1
1 TABLET ORAL ONCE
Status: COMPLETED | OUTPATIENT
Start: 2020-03-05 | End: 2020-03-05

## 2020-03-05 RX ADMIN — OXYCODONE HYDROCHLORIDE AND ACETAMINOPHEN 1 TABLET: 5; 325 TABLET ORAL at 08:59

## 2020-03-05 RX ADMIN — PROPARACAINE HYDROCHLORIDE 2 DROP: 5 SOLUTION/ DROPS OPHTHALMIC at 09:00

## 2020-03-05 RX ADMIN — FLUORESCEIN SODIUM 1 MG: 1 STRIP OPHTHALMIC at 09:29

## 2020-03-05 ASSESSMENT — PAIN DESCRIPTION - DESCRIPTORS: DESCRIPTORS: BURNING

## 2020-03-05 ASSESSMENT — ENCOUNTER SYMPTOMS
SORE THROAT: 0
EYE REDNESS: 1
VOMITING: 0
DIARRHEA: 0
COUGH: 0
SHORTNESS OF BREATH: 0
ABDOMINAL PAIN: 0
BACK PAIN: 0
EYE PAIN: 1
NAUSEA: 0

## 2020-03-05 ASSESSMENT — PAIN SCALES - GENERAL
PAINLEVEL_OUTOF10: 10
PAINLEVEL_OUTOF10: 10

## 2020-03-05 ASSESSMENT — PAIN DESCRIPTION - LOCATION: LOCATION: EYE

## 2020-03-05 ASSESSMENT — PAIN DESCRIPTION - PAIN TYPE: TYPE: ACUTE PAIN

## 2020-03-05 ASSESSMENT — PAIN DESCRIPTION - ORIENTATION: ORIENTATION: RIGHT;LEFT

## 2020-03-05 NOTE — ED PROVIDER NOTES
[facility] ED  eMERGENCYdEPARTMENT eNCOUnter      Pt Name: Napoleon Carrero  MRN: [id number]  [place] 1970  Date of evaluation: 3/5/2020  Tierra Rothman MD    CHIEF COMPLAINT           HPI  Napoleon Carrero is a 47 y.o. female per chart review has a h/o HTN, NIMO, Hpl, MVP presents to the ED with L eye burning, blurry vision x 30 min. Pt notes gradual onset, moderate, constant, burning pain in L eye x 30 min. +Blurry vision in peripheral vision. Pt denies fever, n/v, cp, sob, dysuria, diarrhea. ROS  Review of Systems   Constitutional: Negative for activity change, chills and fever. HENT: Negative for ear pain and sore throat. Eyes: Positive for pain, redness and visual disturbance. Respiratory: Negative for cough and shortness of breath. Cardiovascular: Negative for chest pain, palpitations and leg swelling. Gastrointestinal: Negative for abdominal pain, diarrhea, nausea and vomiting. Genitourinary: Negative for dysuria. Musculoskeletal: Negative for back pain. Skin: Negative for rash. Neurological: Negative for dizziness and weakness. Except as noted above the remainder of the review of systems was reviewed and negative.        PAST MEDICAL HISTORY     Past Medical History:   Diagnosis Date    Hypertension          SURGICAL HISTORY       Past Surgical History:   Procedure Laterality Date     SECTION      OTHER SURGICAL HISTORY Bilateral     breast augmentation    UPPP UVULOPALATOPHARYGOPLASTY           CURRENTMEDICATIONS       Previous Medications    ALPRAZOLAM (XANAX) 0.25 MG TABLET    TAKE 1 TABLET BY MOUTH EVERY DAY AN NEEDED FOR ANXIETY/PANIC ATTACK    AMLODIPINE (NORVASC) 2.5 MG TABLET    Take 2.5 mg by mouth daily    ATENOLOL (TENORMIN) 50 MG TABLET    Take 50 mg by mouth daily    IBUPROFEN (ADVIL;MOTRIN) 800 MG TABLET    Take 800 mg by mouth every 6 hours as needed for Pain    IBUPROFEN (ADVIL;MOTRIN) 800 MG TABLET    Take 1 tablet by mouth Head: Normocephalic. Right Ear: External ear normal.      Left Ear: External ear normal.   Eyes:      Pupils: Pupils are equal, round, and reactive to light. Comments: +L eye conjunctival injection. Tetracaine drops provided mild relief to L eye. Fluorescein used which shows small corneal abrasion lateral to L lateral iris. OD 20/20. OS 20/800. OU 20/200. Pt without contacts in. Neck:      Musculoskeletal: Normal range of motion and neck supple. Cardiovascular:      Rate and Rhythm: Normal rate and regular rhythm. Heart sounds: Normal heart sounds. Pulmonary:      Effort: Pulmonary effort is normal.      Breath sounds: Normal breath sounds. Abdominal:      General: Bowel sounds are normal. There is no distension. Palpations: Abdomen is soft. Tenderness: There is no abdominal tenderness. Musculoskeletal: Normal range of motion. Skin:     General: Skin is warm and dry. Neurological:      Mental Status: She is alert and oriented to person, place, and time. Psychiatric:         Mood and Affect: Mood normal.           MDM  48 yo female presents to the ED with L eye burning pain, blurry vision. Pt given PO percocet with moderate relief. Pt reassessed and feeling a little better with tetracaine. Unsure of etiology of blurry vision. Potential etiologies include iritis, acute angle glaucoma, hyphema. Given significant blurry vision, case discussed with Dr. Thaddeus Rebolledo at [facility] who recommended DC and f/u immediately. Pt wears contacts. Pt given prescription for tobramycin eye drops and will be discharged to [facility] eye. Pt understands plan. FINAL IMPRESSION      1. Corneal abrasion, unspecified laterality, initial encounter    2. Pain around left eye    3.  Blurry vision, left eye          DISPOSITION/PLAN   DISPOSITION Decision To Discharge 03/05/2020 09:44:08 AM        DISCHARGE MEDICATIONS:  [unfilled]         Kevin Babin MD(electronically signed)  Attending Emergency Physician            Shane Montemayor MD  03/05/20 [id number]

## 2020-03-05 NOTE — ED TRIAGE NOTES
Pt comes to er with c/o burning to  Left eye this morning. Pt states that she put her contact in and everything went blurry. Pt still very  Sensitive to light. Eye is red but pupils equal and reactive.  Pt also states that she uses meaningful beauty under her eyes for the  First time today and wonders if some  May have gotten in her reye

## 2021-07-12 ENCOUNTER — OFFICE VISIT (OUTPATIENT)
Dept: OBGYN CLINIC | Age: 51
End: 2021-07-12
Payer: COMMERCIAL

## 2021-07-12 VITALS
HEART RATE: 104 BPM | SYSTOLIC BLOOD PRESSURE: 122 MMHG | DIASTOLIC BLOOD PRESSURE: 60 MMHG | BODY MASS INDEX: 24.27 KG/M2 | WEIGHT: 137 LBS | HEIGHT: 63 IN

## 2021-07-12 DIAGNOSIS — Z12.31 VISIT FOR SCREENING MAMMOGRAM: ICD-10-CM

## 2021-07-12 DIAGNOSIS — Z11.51 SCREENING FOR HPV (HUMAN PAPILLOMAVIRUS): ICD-10-CM

## 2021-07-12 DIAGNOSIS — N92.4 ABNORMAL PERIMENOPAUSAL BLEEDING: ICD-10-CM

## 2021-07-12 DIAGNOSIS — Z01.419 VISIT FOR GYNECOLOGIC EXAMINATION: Primary | ICD-10-CM

## 2021-07-12 PROCEDURE — 99386 PREV VISIT NEW AGE 40-64: CPT | Performed by: OBSTETRICS & GYNECOLOGY

## 2021-07-12 NOTE — PROGRESS NOTES
Postmenopausal Annual    Subjective:     Brigitte Magallon is a 45y.o. year old female    female who presents today for her annual well woman exam.  The patient is sexually active. The patient is not currently taking hormone replacement therapy. Cyclic bleeding is reported and is irregular bleeding. The patient has regular exercise: no    Vitals:  /60 (Site: Left Upper Arm, Position: Sitting, Cuff Size: Medium Adult)   Pulse 104   Ht 5' 3\" (1.6 m)   Wt 137 lb (62.1 kg)   LMP 2021 (Approximate)   BMI 24.27 kg/m²   Allergies:  Latex, Adhesive tape, Ciprofloxacin, Codeine, Demerol hcl [meperidine], and Tetracyclines & related  Past Medical History:   Diagnosis Date    Hypertension      Past Surgical History:   Procedure Laterality Date     SECTION      OTHER SURGICAL HISTORY Bilateral     breast augmentation    UPPP UVULOPALATOPHARYGOPLASTY       No family history on file. Social History     Socioeconomic History    Marital status:      Spouse name: Not on file    Number of children: Not on file    Years of education: Not on file    Highest education level: Not on file   Occupational History    Not on file   Tobacco Use    Smoking status: Current Every Day Smoker     Packs/day: 1.00     Years: 30.00     Pack years: 30.00    Smokeless tobacco: Never Used   Substance and Sexual Activity    Alcohol use: Yes     Alcohol/week: 0.0 standard drinks     Comment: rare    Drug use: No    Sexual activity: Never     Partners: Male   Other Topics Concern    Not on file   Social History Narrative    Not on file     Social Determinants of Health     Financial Resource Strain:     Difficulty of Paying Living Expenses:    Food Insecurity:     Worried About Running Out of Food in the Last Year:     [address] in the Last Year:    Transportation Needs:     Lack of Transportation (Medical):      Lack of Transportation (Non-Medical):    Physical Activity:     Days of Exercise per Week:     Minutes of Exercise per Session:    Stress:     Feeling of Stress :    Social Connections:     Frequency of Communication with Friends and Family:     Frequency of Social Gatherings with Friends and Family:     Attends [employer] Services:     Active Member of Clubs or Organizations:     Attends Club or Organization Meetings:     Marital Status:    Intimate Partner Violence:     Fear of Current or Ex-Partner:     Emotionally Abused:     Physically Abused:     Sexually Abused:          Last mammogram 2 yrs   Breast cancer risk factors : mother 65 yo , daughter 26 yo . Last Pap 2017 wnl     Patient's last menstrual period was 07/05/2021 (approximate). Age at menopause onset: 11/2020 , after that continued with irregular vaginal bleeding. Menopausal symptom assessment:  Vasomotor symptoms: \" all the time \"   Urinary incontinence &  symptoms: denies   Sexual dysfunction: denies   Present Hormonalmedications: denies     Osteoporosis risk assessment : denies   Last bone mineral density : denies     History of abnormal lipids: no  Hypertension yes - on meds. ( tenormin and norvasc) . Stroke/MI no    Yearly flu vaccine recommended for persons aged 48 and older. Colonoscopy declines. FH colon cancer : father,. Review of Systems  Review of Systems  All other systems reviewed and are negative. Review of Systems   Constitutional: Negative for chills and fever. Respiratory: Negative for cough and shortness of breath. Cardiovascular: Negative for chest painand palpitations. Gastrointestinal: Negative for nausea and vomiting. Genitourinary: Negative for dysuria, frequency and urgency. Musculoskeletal: Negative formyalgias. Neurological: Negative for dizziness, seizures and headaches.    Psychiatric/Behavioral: Negative for depression and suicidal ideas.     :     Vitals:  /60 (Site: Left Upper Arm, Position: Sitting, Cuff Size: Medium Adult) Pulse 104   Ht 5' 3\" (1.6 m)   Wt 137 lb (62.1 kg)   LMP 07/05/2021 (Approximate)   BMI 24.27 kg/m²     Physical Exam  Physical Exam    Constitutional: She is oriented to person, place, and time and well-developed, well-nourished, and in nodistress. HENT:   Head: Normocephalic and atraumatic. Eyes: Conjunctivae are normal. Pupils are equal, round, and reactive to light. Neck: Normal range ofmotion. Neck supple. Cardiovascular: Normal rate and regular rhythm. Pulmonary/Chest: Effort normal and breath sounds normal. No respiratory distress. Right breast exhibits noinverted nipple, no mass, no nipple discharge, no skin change and no tenderness. Left breast exhibits no inverted nipple, no mass, no nipple discharge, no skin change and no tenderness. Breasts are symmetrical.   Abdominal: Soft. Bowel sounds are normal.   Genitourinary: Vagina normal, uterus normal and cervix normal. No vaginal discharge found. Musculoskeletal: Normal range ofmotion. Neurological: She is alert and oriented to person, place, and time. She has normal reflexes. Psychiatric: Memory, affect andjudgment normal.       Assessment:      Diagnosis Orders   1. Visit for gynecologic examination  PAP SMEAR   2. Screening for HPV (human papillomavirus)  PAP SMEAR   3. Visit for screening mammogram  JHONNY DIGITAL SCREEN W OR WO CAD BILATERAL   4. Abnormal perimenopausal bleeding  Follicle Stimulating Hormone    US NON OB TRANSVAGINAL    US PELVIS COMPLETE       Body mass index is 24.27 kg/m². Obesity:  Normal weight  Smoking:  yes    Plan:   PAP SMEAR : indicated:  performed.   Breast exam : Normal       Obesity Counseling:  Given  Smoking Counseling: GIVEN     Orders Placed This Encounter   Procedures    JHONNY DIGITAL SCREEN W OR WO CAD BILATERAL     Standing Status:   Future     Standing Expiration Date:   9/12/2022     Order Specific Question:   Reason for exam:     Answer:   screening    US NON OB TRANSVAGINAL     Standing Status: Future     Standing Expiration Date:   7/12/2022    US PELVIS COMPLETE     Standing Status:   Future     Standing Expiration Date:   7/12/2022     Order Specific Question:   Reason for exam:     Answer:   AUB    PAP SMEAR     Standing Status:   Future     Standing Expiration Date:   7/12/2022     Order Specific Question:   Collection Type     Answer: Thin Prep     Order Specific Question:   Prior Abnormal Pap Test     Answer:   No     Order Specific Question:   Screening or Diagnostic     Answer:   Screening     Order Specific Question:   HPV Requested? Answer:   Yes     Order Specific Question:   High Risk Patient     Answer:   N/A    Follicle Stimulating Hormone     Standing Status:   Future     Standing Expiration Date:   7/12/2022       No orders of the defined types were placed in this encounter. Follow up:  Return in about 2 weeks (around 7/26/2021). Maxine Hopper MD        HEALTH MAINTENANCE:   Health information given. Women's Health patient information and counselling done. regarding risk/benefits/alternatives to Hormone Therapy and need for yearly re-evaluation. Periodic Pap smear discussed. Mammogram recommended yearly. Colon cancer screening by age 48 & every 5-10years. Bone mineral density (by age 72 or sooner if indication it would affect Hormone Therapy management or other risk factors for osteoporosis). Liza Menchaca M.D., F.A.C.O. G

## 2021-07-18 LAB — PAP SMEAR: NORMAL

## 2022-05-27 ENCOUNTER — OFFICE VISIT (OUTPATIENT)
Dept: PAIN MANAGEMENT | Age: 52
End: 2022-05-27
Payer: COMMERCIAL

## 2022-05-27 DIAGNOSIS — M79.602 LEFT ARM PAIN: Primary | ICD-10-CM

## 2022-05-27 PROCEDURE — 95911 NRV CNDJ TEST 9-10 STUDIES: CPT | Performed by: PHYSICAL MEDICINE & REHABILITATION

## 2022-05-27 PROCEDURE — 95886 MUSC TEST DONE W/N TEST COMP: CPT | Performed by: PHYSICAL MEDICINE & REHABILITATION

## 2022-05-27 NOTE — PROGRESS NOTES
Electromyography (EMG)/Nerve conduction studies (NCS) Report: Upper Extremities    Name: Genevieve Martel   : 1970  Date: 2022   Physician: Vickie Wynn MD        INDICATIONS: Genevieve Martel is a 47 y.o. female who presents for electrodiagnostic evaluation for bilateral cervical radicular pain by request of [facility]. Her main symptom for evaluation is left arm pain. She is ambidextrous. Both limbs are necessary to examine in order to evaluate for any evidence of systemic disease as well as establish normal baseline values from which to compare any abnormal unilateral findings. The study is explained and verbal consent to proceed is obtained. NERVE CONDUCTION STUDIES:  Sensory nerve conduction studies: Bilateral median sensory nerve conduction studies to the second digit demonstrate normal peak latencies and amplitudes. Bilateral ulnar sensory nerve conduction studies to the fifth digit demonstrate normal peak latencies and amplitudes. Bilateral radial sensory nerve conduction studies to the base of the thumb demonstrate normal peak latencies and amplitudes. Bilateral upper limb temperatures are normal.     Motor nerve conduction studies: Bilateral median motor nerve conduction studies with pickup over the abductor pollicis brevis demonstrate normal distal latencies and amplitudes. Bilateral ulnar motor nerve conduction studies with pickup over the abductor digiti minimi demonstrate normal distal latencies and amplitudes. ELECTROMYOGRAPHY: A disposable monopolar needle is used to evaluate the left deltoid, biceps, triceps, pronator teres, brachioradialis, extensor indicis proprius, first dorsal interosseous, and opponens pollicis. Left pronator teres demonstrates increased insertional activity with trace abnormal spontaneous activity. Multiple sampling of the left biceps and triceps are free of any clear abnormal spontaneous activity.  Left anconeus is also sampled without any abnormal spontaneous activity. All of the other muscles sampled are free of any increased insertional activity or any abnormal spontaneous activity. Left deltoid demonstrates normal motor unit recruitment characteristics with amplitudes in the 3 to 5 mV range. Left biceps demonstrates normal motor unit recruitment characteristics with amplitudes in the 2 to 4 mV range. Left triceps demonstrates normal motor unit recruitment characteristics with amplitudes in the 4 to 6 mV range. Motor unit recruitment in all other muscles is unremarkable. The right deltoid, biceps, triceps, brachioradialis, extensor indicis proprius, first dorsal interosseous, and opponens pollicis are also sampled. All of the muscles sampled are free of any increased insertional activity or any abnormal spontaneous activity. Motor unit recruitment is unremarkable. Bilateral mid cervical paraspinal muscle sampling is free of any increased insertional activity or any abnormal spontaneous activity. SUMMARY:  This study is limited, but normal:  1. There is no clear electrodiagnostic evidence for an active bilateral cervical motor radiculopathy as clinically questioned. There are isolated changes in a single Left C6-C7 muscle that are unable to be localized or confirmed given normal electromyography findings in other left arm muscles. 2. There is no current evidence for a bilateral median mononeuropathy at the wrist or generalized large fiber sensorimotor peripheral polyneuropathy. RECOMMENDATIONS: Today's study does not clearly explain the patient's left arm pain. The patient should follow up with [facility] as previously instructed. If her symptoms persist or worsen, further electrodiagnostic evaluation may be considered if the patient is agreeable. Clinical correlation is recommended.

## 2022-07-26 ENCOUNTER — OFFICE VISIT (OUTPATIENT)
Dept: FAMILY MEDICINE CLINIC | Age: 52
End: 2022-07-26
Payer: COMMERCIAL

## 2022-07-26 VITALS
RESPIRATION RATE: 14 BRPM | HEIGHT: 63 IN | TEMPERATURE: 97.4 F | SYSTOLIC BLOOD PRESSURE: 110 MMHG | DIASTOLIC BLOOD PRESSURE: 68 MMHG | OXYGEN SATURATION: 99 % | BODY MASS INDEX: 24.98 KG/M2 | WEIGHT: 141 LBS | HEART RATE: 74 BPM

## 2022-07-26 DIAGNOSIS — R50.9 FEVER, UNSPECIFIED FEVER CAUSE: ICD-10-CM

## 2022-07-26 DIAGNOSIS — U07.1 COVID-19: Primary | ICD-10-CM

## 2022-07-26 LAB
INFLUENZA A ANTIBODY: NORMAL
INFLUENZA B ANTIBODY: NORMAL
Lab: ABNORMAL
PERFORMING INSTRUMENT: ABNORMAL
QC PASS/FAIL: ABNORMAL
SARS-COV-2, POC: DETECTED

## 2022-07-26 PROCEDURE — 87426 SARSCOV CORONAVIRUS AG IA: CPT | Performed by: NURSE PRACTITIONER

## 2022-07-26 PROCEDURE — 87804 INFLUENZA ASSAY W/OPTIC: CPT | Performed by: NURSE PRACTITIONER

## 2022-07-26 PROCEDURE — 99213 OFFICE O/P EST LOW 20 MIN: CPT | Performed by: NURSE PRACTITIONER

## 2022-07-26 PROCEDURE — G8420 CALC BMI NORM PARAMETERS: HCPCS | Performed by: NURSE PRACTITIONER

## 2022-07-26 PROCEDURE — 3017F COLORECTAL CA SCREEN DOC REV: CPT | Performed by: NURSE PRACTITIONER

## 2022-07-26 PROCEDURE — G8427 DOCREV CUR MEDS BY ELIG CLIN: HCPCS | Performed by: NURSE PRACTITIONER

## 2022-07-26 PROCEDURE — 4004F PT TOBACCO SCREEN RCVD TLK: CPT | Performed by: NURSE PRACTITIONER

## 2022-07-26 ASSESSMENT — ENCOUNTER SYMPTOMS
DIARRHEA: 0
VOMITING: 0
SHORTNESS OF BREATH: 0
SORE THROAT: 0
WHEEZING: 0
NAUSEA: 1
RHINORRHEA: 0
COUGH: 0

## 2022-07-26 NOTE — PATIENT INSTRUCTIONS
Out of [place] Sunday as long as no fever or chills and getting better     Return if symptoms worsen or fail to improve. Will need quarantine until symptom improvement or 5 days from of onset of symptoms. Discussed OTC comfort care. Discussed hydration and self proning for coughing or SOB. Pt to f/u if not improving as expected or to Er if symptoms severe. Spent much time educating the patient on COVID and answering questions. For symptomatic immunocompetent patients with mild disease who are cared for at home, isolation can usually be discontinued when the following criteria are met:  ? At least five days have passed since symptoms first appeared AND  ? At least one day (24 hours) has passed since resolution of fever without the use of fever-reducing medications AND   ? There is improvement in symptoms (eg, cough, shortness of breath)  After discontinuing home isolation, patients should continue to wear a well-fitting mask around others. The total duration of isolation plus strict masking is 10 days. During this time, patients should avoid people who are immunocompromised or at high risk for severe disease. Additional information on restrictions (eg, travel) after the five-day isolation period can be found on the [employer] website.

## 2022-08-05 ASSESSMENT — ENCOUNTER SYMPTOMS: EYE DISCHARGE: 1

## 2023-03-14 ENCOUNTER — HOSPITAL ENCOUNTER (OUTPATIENT)
Dept: WOMENS IMAGING | Age: 53
Discharge: HOME | End: 2023-03-16
Payer: COMMERCIAL

## 2023-03-14 DIAGNOSIS — Z12.31 ENCOUNTER FOR SCREENING MAMMOGRAM FOR BREAST CANCER: ICD-10-CM

## 2023-03-14 PROCEDURE — 77067 SCR MAMMO BI INCL CAD: CPT

## 2023-06-26 ENCOUNTER — OFFICE VISIT (OUTPATIENT)
Dept: CARDIOLOGY CLINIC | Age: 53
End: 2023-06-26
Payer: COMMERCIAL

## 2023-06-26 VITALS
HEART RATE: 77 BPM | SYSTOLIC BLOOD PRESSURE: 136 MMHG | OXYGEN SATURATION: 98 % | HEIGHT: 63 IN | DIASTOLIC BLOOD PRESSURE: 90 MMHG | WEIGHT: 123 LBS | BODY MASS INDEX: 21.79 KG/M2

## 2023-06-26 DIAGNOSIS — I10 ESSENTIAL HYPERTENSION: ICD-10-CM

## 2023-06-26 DIAGNOSIS — R06.09 DOE (DYSPNEA ON EXERTION): ICD-10-CM

## 2023-06-26 DIAGNOSIS — R07.2 PRECORDIAL PAIN: ICD-10-CM

## 2023-06-26 DIAGNOSIS — R00.2 PALPITATIONS: Primary | ICD-10-CM

## 2023-06-26 PROCEDURE — 3017F COLORECTAL CA SCREEN DOC REV: CPT | Performed by: INTERNAL MEDICINE

## 2023-06-26 PROCEDURE — 93000 ELECTROCARDIOGRAM COMPLETE: CPT | Performed by: INTERNAL MEDICINE

## 2023-06-26 PROCEDURE — 3075F SYST BP GE 130 - 139MM HG: CPT | Performed by: INTERNAL MEDICINE

## 2023-06-26 PROCEDURE — G8427 DOCREV CUR MEDS BY ELIG CLIN: HCPCS | Performed by: INTERNAL MEDICINE

## 2023-06-26 PROCEDURE — G8420 CALC BMI NORM PARAMETERS: HCPCS | Performed by: INTERNAL MEDICINE

## 2023-06-26 PROCEDURE — 4004F PT TOBACCO SCREEN RCVD TLK: CPT | Performed by: INTERNAL MEDICINE

## 2023-06-26 PROCEDURE — 99204 OFFICE O/P NEW MOD 45 MIN: CPT | Performed by: INTERNAL MEDICINE

## 2023-06-26 PROCEDURE — 3080F DIAST BP >= 90 MM HG: CPT | Performed by: INTERNAL MEDICINE

## 2023-07-26 ENCOUNTER — TRANSCRIBE ORDERS (OUTPATIENT)
Dept: ADMINISTRATIVE | Age: 53
End: 2023-07-26

## 2023-07-26 DIAGNOSIS — R06.09 DYSPNEA ON EXERTION: ICD-10-CM

## 2023-07-26 DIAGNOSIS — R07.2 PRECORDIAL PAIN: Primary | ICD-10-CM

## 2023-08-10 ENCOUNTER — HOSPITAL ENCOUNTER (OUTPATIENT)
Dept: NUCLEAR MEDICINE | Age: 53
Discharge: HOME | End: 2023-08-12
Attending: INTERNAL MEDICINE
Payer: COMMERCIAL

## 2023-08-10 ENCOUNTER — HOSPITAL ENCOUNTER (OUTPATIENT)
Age: 53
Discharge: HOME | End: 2023-08-12
Attending: INTERNAL MEDICINE
Payer: COMMERCIAL

## 2023-08-10 VITALS
BODY MASS INDEX: 21.79 KG/M2 | SYSTOLIC BLOOD PRESSURE: 136 MMHG | HEIGHT: 63 IN | WEIGHT: 123 LBS | DIASTOLIC BLOOD PRESSURE: 90 MMHG

## 2023-08-10 DIAGNOSIS — R00.2 PALPITATIONS: ICD-10-CM

## 2023-08-10 DIAGNOSIS — R07.2 PRECORDIAL PAIN: ICD-10-CM

## 2023-08-10 DIAGNOSIS — R06.09 DYSPNEA ON EXERTION: ICD-10-CM

## 2023-08-10 DIAGNOSIS — R06.09 DOE (DYSPNEA ON EXERTION): ICD-10-CM

## 2023-08-10 LAB
ECHO AV AREA PEAK VELOCITY: 2.6 CM2
ECHO AV AREA VTI: 2.7 CM2
ECHO AV AREA/BSA PEAK VELOCITY: 1.7 CM2/M2
ECHO AV AREA/BSA VTI: 1.7 CM2/M2
ECHO AV CUSP MM: 1.5 CM
ECHO AV MEAN GRADIENT: 2 MMHG
ECHO AV MEAN VELOCITY: 0.7 M/S
ECHO AV PEAK GRADIENT: 5 MMHG
ECHO AV PEAK VELOCITY: 1.2 M/S
ECHO AV VELOCITY RATIO: 0.83
ECHO AV VTI: 28.4 CM
ECHO BSA: 1.57 M2
ECHO BSA: 1.57 M2
ECHO LA DIAMETER INDEX: 1.46 CM/M2
ECHO LA DIAMETER: 2.3 CM
ECHO LA VOL 2C: 39 ML (ref 22–52)
ECHO LA VOL 2C: 43 ML (ref 22–52)
ECHO LA VOL 4C: 29 ML (ref 22–52)
ECHO LA VOL 4C: 32 ML (ref 22–52)
ECHO LA VOLUME AREA LENGTH: 37 ML
ECHO LA VOLUME INDEX AREA LENGTH: 24 ML/M2 (ref 16–34)
ECHO LV E' LATERAL VELOCITY: 10 CM/S
ECHO LV E' SEPTAL VELOCITY: 7 CM/S
ECHO LV EDV A2C: 38 ML
ECHO LV EDV A4C: 56 ML
ECHO LV EDV BP: 47 ML (ref 56–104)
ECHO LV EDV INDEX A4C: 36 ML/M2
ECHO LV EDV INDEX BP: 30 ML/M2
ECHO LV EDV NDEX A2C: 24 ML/M2
ECHO LV EJECTION FRACTION A2C: 63 %
ECHO LV EJECTION FRACTION A4C: 72 %
ECHO LV EJECTION FRACTION BIPLANE: 68 % (ref 55–100)
ECHO LV ESV A2C: 14 ML
ECHO LV ESV A4C: 15 ML
ECHO LV ESV BP: 15 ML (ref 19–49)
ECHO LV ESV INDEX A2C: 9 ML/M2
ECHO LV ESV INDEX A4C: 10 ML/M2
ECHO LV ESV INDEX BP: 10 ML/M2
ECHO LV FRACTIONAL SHORTENING: 34 % (ref 28–44)
ECHO LV INTERNAL DIMENSION DIASTOLE INDEX: 2.42 CM/M2
ECHO LV INTERNAL DIMENSION DIASTOLIC: 3.8 CM (ref 3.9–5.3)
ECHO LV INTERNAL DIMENSION SYSTOLIC INDEX: 1.59 CM/M2
ECHO LV INTERNAL DIMENSION SYSTOLIC: 2.5 CM
ECHO LV IVSD: 1 CM (ref 0.6–0.9)
ECHO LV IVSS: 1.3 CM
ECHO LV MASS 2D: 109 G (ref 67–162)
ECHO LV MASS INDEX 2D: 69.4 G/M2 (ref 43–95)
ECHO LV POSTERIOR WALL DIASTOLIC: 0.9 CM (ref 0.6–0.9)
ECHO LV POSTERIOR WALL SYSTOLIC: 1.1 CM
ECHO LV RELATIVE WALL THICKNESS RATIO: 0.47
ECHO LVOT AREA: 3.1 CM2
ECHO LVOT AV VTI INDEX: 0.91
ECHO LVOT DIAM: 2 CM
ECHO LVOT MEAN GRADIENT: 2 MMHG
ECHO LVOT PEAK GRADIENT: 4 MMHG
ECHO LVOT PEAK VELOCITY: 1 M/S
ECHO LVOT STROKE VOLUME INDEX: 51.8 ML/M2
ECHO LVOT SV: 81.3 ML
ECHO LVOT VTI: 25.9 CM
ECHO MV A VELOCITY: 0.7 M/S
ECHO MV AREA VTI: 3.2 CM2
ECHO MV E DECELERATION TIME (DT): 215.1 MS
ECHO MV E VELOCITY: 0.65 M/S
ECHO MV E/A RATIO: 0.93
ECHO MV E/E' LATERAL: 6.5
ECHO MV E/E' RATIO (AVERAGED): 7.89
ECHO MV E/E' SEPTAL: 9.29
ECHO MV LVOT VTI INDEX: 0.97
ECHO MV MAX VELOCITY: 0.8 M/S
ECHO MV MEAN GRADIENT: 1 MMHG
ECHO MV MEAN VELOCITY: 0.4 M/S
ECHO MV PEAK GRADIENT: 3 MMHG
ECHO MV VTI: 25.2 CM
ECHO PV MAX VELOCITY: 0.8 M/S
ECHO PV PEAK GRADIENT: 2 MMHG
ECHO RV INTERNAL DIMENSION: 2.7 CM
ECHO RV TAPSE: 2 CM (ref 1.7–?)
ECHO TV REGURGITANT MAX VELOCITY: 1.58 M/S
ECHO TV REGURGITANT PEAK GRADIENT: 10 MMHG
NUC STRESS EJECTION FRACTION: 74 %
STRESS ANGINA INDEX: 0
STRESS BASELINE DIAS BP: 72 MMHG
STRESS BASELINE HR: 60 BPM
STRESS BASELINE ST DEPRESSION: 0 MM
STRESS BASELINE SYS BP: 130 MMHG
STRESS ESTIMATED WORKLOAD: 10.1 METS
STRESS EXERCISE DUR MIN: 9 MIN
STRESS EXERCISE DUR SEC: 23 SEC
STRESS PEAK DIAS BP: 84 MMHG
STRESS PEAK SYS BP: 150 MMHG
STRESS PERCENT HR ACHIEVED: 89 %
STRESS POST PEAK HR: 148 BPM
STRESS RATE PRESSURE PRODUCT: NORMAL BPM*MMHG
STRESS SR DUKE TREADMILL SCORE: 9
STRESS ST DEPRESSION: 0 MM
STRESS TARGET HR: 166 BPM
TID: 0.84

## 2023-08-10 PROCEDURE — 2580000003 HC RX 258: Performed by: INTERNAL MEDICINE

## 2023-08-10 PROCEDURE — A9502 TC99M TETROFOSMIN: HCPCS | Performed by: INTERNAL MEDICINE

## 2023-08-10 PROCEDURE — 93017 CV STRESS TEST TRACING ONLY: CPT

## 2023-08-10 PROCEDURE — 78452 HT MUSCLE IMAGE SPECT MULT: CPT

## 2023-08-10 PROCEDURE — 93306 TTE W/DOPPLER COMPLETE: CPT

## 2023-08-10 PROCEDURE — 3430000000 HC RX DIAGNOSTIC RADIOPHARMACEUTICAL: Performed by: INTERNAL MEDICINE

## 2023-08-10 RX ORDER — SODIUM CHLORIDE 0.9 % (FLUSH) 0.9 %
10 SYRINGE (ML) INJECTION PRN
Status: COMPLETED | OUTPATIENT
Start: 2023-08-10 | End: 2023-08-10

## 2023-08-10 RX ADMIN — SODIUM CHLORIDE, PRESERVATIVE FREE 10 ML: 5 INJECTION INTRAVENOUS at 09:54

## 2023-08-10 RX ADMIN — TETROFOSMIN 11.7 MILLICURIE: 1.38 INJECTION, POWDER, LYOPHILIZED, FOR SOLUTION INTRAVENOUS at 08:36

## 2023-08-10 RX ADMIN — SODIUM CHLORIDE, PRESERVATIVE FREE 10 ML: 5 INJECTION INTRAVENOUS at 08:37

## 2023-08-10 RX ADMIN — TETROFOSMIN 32.8 MILLICURIE: 1.38 INJECTION, POWDER, LYOPHILIZED, FOR SOLUTION INTRAVENOUS at 09:54

## 2023-08-10 RX ADMIN — SODIUM CHLORIDE, PRESERVATIVE FREE 10 ML: 5 INJECTION INTRAVENOUS at 09:53

## 2023-08-21 ENCOUNTER — OFFICE VISIT (OUTPATIENT)
Dept: CARDIOLOGY CLINIC | Age: 53
End: 2023-08-21
Payer: COMMERCIAL

## 2023-08-21 VITALS
HEIGHT: 63 IN | DIASTOLIC BLOOD PRESSURE: 66 MMHG | OXYGEN SATURATION: 99 % | WEIGHT: 125.8 LBS | BODY MASS INDEX: 22.29 KG/M2 | SYSTOLIC BLOOD PRESSURE: 108 MMHG | HEART RATE: 65 BPM

## 2023-08-21 DIAGNOSIS — I47.1 PSVT (PAROXYSMAL SUPRAVENTRICULAR TACHYCARDIA) (HCC): Primary | ICD-10-CM

## 2023-08-21 DIAGNOSIS — R00.2 PALPITATIONS: ICD-10-CM

## 2023-08-21 PROBLEM — M19.91 LOCALIZED, PRIMARY OSTEOARTHRITIS: Status: ACTIVE | Noted: 2017-12-19

## 2023-08-21 PROBLEM — M47.812 SPONDYLOSIS OF CERVICAL SPINE: Status: ACTIVE | Noted: 2023-08-21

## 2023-08-21 PROBLEM — F41.0 PANIC DISORDER WITHOUT AGORAPHOBIA: Status: ACTIVE | Noted: 2019-05-11

## 2023-08-21 PROBLEM — F41.9 ANXIETY: Status: ACTIVE | Noted: 2019-05-11

## 2023-08-21 PROBLEM — S86.112D: Status: ACTIVE | Noted: 2017-12-19

## 2023-08-21 PROBLEM — M54.2 NECK PAIN: Status: ACTIVE | Noted: 2023-08-21

## 2023-08-21 PROBLEM — H16.142 PUNCTATE KERATITIS OF LEFT EYE: Status: ACTIVE | Noted: 2020-03-05

## 2023-08-21 PROCEDURE — 99214 OFFICE O/P EST MOD 30 MIN: CPT | Performed by: INTERNAL MEDICINE

## 2023-08-21 PROCEDURE — 3078F DIAST BP <80 MM HG: CPT | Performed by: INTERNAL MEDICINE

## 2023-08-21 PROCEDURE — G8427 DOCREV CUR MEDS BY ELIG CLIN: HCPCS | Performed by: INTERNAL MEDICINE

## 2023-08-21 PROCEDURE — 3074F SYST BP LT 130 MM HG: CPT | Performed by: INTERNAL MEDICINE

## 2023-08-21 PROCEDURE — G8420 CALC BMI NORM PARAMETERS: HCPCS | Performed by: INTERNAL MEDICINE

## 2023-08-21 PROCEDURE — 3017F COLORECTAL CA SCREEN DOC REV: CPT | Performed by: INTERNAL MEDICINE

## 2023-08-21 PROCEDURE — 4004F PT TOBACCO SCREEN RCVD TLK: CPT | Performed by: INTERNAL MEDICINE

## 2023-08-21 RX ORDER — ESTRADIOL 0.1 MG/G
CREAM VAGINAL
COMMUNITY
Start: 2023-07-26

## 2023-08-21 RX ORDER — METOPROLOL SUCCINATE 50 MG/1
50 TABLET, EXTENDED RELEASE ORAL DAILY
Qty: 90 TABLET | Refills: 1 | Status: SHIPPED | OUTPATIENT
Start: 2023-08-21

## 2023-08-21 RX ORDER — ESTROGEN,CON/M-PROGEST ACET 0.3-1.5MG
1 TABLET ORAL DAILY
COMMUNITY
Start: 2023-08-09

## 2023-08-21 NOTE — PATIENT INSTRUCTIONS
Stop atenolol  Start metoprolol succinate 50 mg daily. Continue other medications. Quit smoking! Follow up in 3 months, sooner if needed.

## 2023-08-21 NOTE — PROGRESS NOTES
2023    Karla Sheppard, [address] [place] [id number]    RE: Arik Crockett   : 1970     Dear Dr. Karla Sheppard MD :    As you are well aware, Ms. Kandi Martinez is a pleasant 46 y.o.  female with history of Raynaud's phenomenon, anxiety, and hypertension who was kindly referred to me for evaluation of palpitations and murmur. Currently, she reports awakening from sleep in early  with sudden onset palpitations described as \"racing\". The episode was associated with nausea, vomiting, and diaphoresis. She has a cardia EKG monitor which revealed a heart rate of 120 bpm.  The episode lasted for 30 minutes before gradually resolving. She also reports associated \"bruising\" of her right middle finger and feeling cool to the touch which last about 2 days after the episode. She does have a history of Raynaud's phenomenon. She says that she reports a longstanding history of palpitations described as \"skipping\" fluttering which come and go but this episode was different. She previously seen Dr. Lisa Shah over 20 years ago but has not seen a cardiologist or had any recent cardiac testing. No near-syncope or syncope.    23: Patient here for follow-up of palpitations and recent cardiac test results. She continues to report intermittent palpitations. No near-syncope or syncope. No chest pain or worsening exertional dyspnea. She continues to smoke. Her stress test and echocardiogram were within normal limits. Her event monitor showed 3 short salvos of paroxysmal SVT, most suggestive of atrial tachycardia. Current medications:   Current Outpatient Medications   Medication Sig Dispense Refill    PREMPRO 0.3-1.5 MG per tablet Take 1 tablet by mouth daily      estradiol (ESTRACE) 0.1 MG/GM vaginal cream USE 1 GRAM VAGINALLY ONCE DAILY.  MAY DECREASE USE TO TWICE WEEKLY AFTER 2 WEEKS      Cholecalciferol 50 MCG ( UT) CAPS Take 1 capsule by mouth daily (with breakfast)

## 2023-10-31 RX ORDER — METOPROLOL SUCCINATE 50 MG/1
50 TABLET, EXTENDED RELEASE ORAL DAILY
Qty: 90 TABLET | Refills: 3 | Status: SHIPPED | OUTPATIENT
Start: 2023-10-31

## 2023-10-31 NOTE — TELEPHONE ENCOUNTER
Requesting medication refill. Rx requested:  Requested Prescriptions     Pending Prescriptions Disp Refills    metoprolol succinate (TOPROL XL) 50 MG extended release tablet 90 tablet 1     Sig: Take 1 tablet by mouth daily         Last Office Visit:   8/21/2023      Next Visit Date:  Future Appointments   Date Time Provider [address] [place] [address]   11/22/2023 12:20 PM Christelle Meza DO [facility]               Last refill 08/21/2023.

## 2024-01-08 ENCOUNTER — OFFICE VISIT (OUTPATIENT)
Dept: FAMILY MEDICINE CLINIC | Age: 54
End: 2024-01-08
Payer: COMMERCIAL

## 2024-01-08 VITALS
HEART RATE: 81 BPM | SYSTOLIC BLOOD PRESSURE: 116 MMHG | WEIGHT: 128 LBS | RESPIRATION RATE: 16 BRPM | HEIGHT: 63 IN | BODY MASS INDEX: 22.68 KG/M2 | TEMPERATURE: 98 F | OXYGEN SATURATION: 97 % | DIASTOLIC BLOOD PRESSURE: 86 MMHG

## 2024-01-08 DIAGNOSIS — J01.40 ACUTE NON-RECURRENT PANSINUSITIS: Primary | ICD-10-CM

## 2024-01-08 DIAGNOSIS — J34.89 NASAL DRAINAGE: ICD-10-CM

## 2024-01-08 LAB
INFLUENZA A ANTIBODY: NORMAL
INFLUENZA B ANTIBODY: NORMAL
Lab: NORMAL
PERFORMING INSTRUMENT: NORMAL
QC PASS/FAIL: NORMAL
SARS-COV-2, POC: NORMAL

## 2024-01-08 PROCEDURE — G8420 CALC BMI NORM PARAMETERS: HCPCS | Performed by: NURSE PRACTITIONER

## 2024-01-08 PROCEDURE — G8484 FLU IMMUNIZE NO ADMIN: HCPCS | Performed by: NURSE PRACTITIONER

## 2024-01-08 PROCEDURE — 87426 SARSCOV CORONAVIRUS AG IA: CPT | Performed by: NURSE PRACTITIONER

## 2024-01-08 PROCEDURE — 99213 OFFICE O/P EST LOW 20 MIN: CPT | Performed by: NURSE PRACTITIONER

## 2024-01-08 PROCEDURE — 3079F DIAST BP 80-89 MM HG: CPT | Performed by: NURSE PRACTITIONER

## 2024-01-08 PROCEDURE — 3017F COLORECTAL CA SCREEN DOC REV: CPT | Performed by: NURSE PRACTITIONER

## 2024-01-08 PROCEDURE — 87804 INFLUENZA ASSAY W/OPTIC: CPT | Performed by: NURSE PRACTITIONER

## 2024-01-08 PROCEDURE — 3074F SYST BP LT 130 MM HG: CPT | Performed by: NURSE PRACTITIONER

## 2024-01-08 PROCEDURE — G8427 DOCREV CUR MEDS BY ELIG CLIN: HCPCS | Performed by: NURSE PRACTITIONER

## 2024-01-08 PROCEDURE — 4004F PT TOBACCO SCREEN RCVD TLK: CPT | Performed by: NURSE PRACTITIONER

## 2024-01-08 RX ORDER — IPRATROPIUM BROMIDE 42 UG/1
2 SPRAY, METERED NASAL 4 TIMES DAILY
Qty: 15 ML | Refills: 0 | Status: SHIPPED | OUTPATIENT
Start: 2024-01-08 | End: 2024-01-13

## 2024-01-08 RX ORDER — AMOXICILLIN 875 MG/1
875 TABLET, COATED ORAL 2 TIMES DAILY
Qty: 14 TABLET | Refills: 0 | Status: SHIPPED | OUTPATIENT
Start: 2024-01-08 | End: 2024-01-15

## 2024-01-08 SDOH — ECONOMIC STABILITY: INCOME INSECURITY: HOW HARD IS IT FOR YOU TO PAY FOR THE VERY BASICS LIKE FOOD, HOUSING, MEDICAL CARE, AND HEATING?: NOT HARD AT ALL

## 2024-01-08 SDOH — ECONOMIC STABILITY: FOOD INSECURITY: WITHIN THE PAST 12 MONTHS, THE FOOD YOU BOUGHT JUST DIDN'T LAST AND YOU DIDN'T HAVE MONEY TO GET MORE.: NEVER TRUE

## 2024-01-08 SDOH — ECONOMIC STABILITY: HOUSING INSECURITY
IN THE LAST 12 MONTHS, WAS THERE A TIME WHEN YOU DID NOT HAVE A STEADY PLACE TO SLEEP OR SLEPT IN A SHELTER (INCLUDING NOW)?: NO

## 2024-01-08 SDOH — ECONOMIC STABILITY: FOOD INSECURITY: WITHIN THE PAST 12 MONTHS, YOU WORRIED THAT YOUR FOOD WOULD RUN OUT BEFORE YOU GOT MONEY TO BUY MORE.: NEVER TRUE

## 2024-01-08 ASSESSMENT — ENCOUNTER SYMPTOMS
ABDOMINAL PAIN: 0
SHORTNESS OF BREATH: 0
CHEST TIGHTNESS: 0
COUGH: 1
NAUSEA: 0
RHINORRHEA: 1
SORE THROAT: 0
DIARRHEA: 0

## 2024-01-08 ASSESSMENT — PATIENT HEALTH QUESTIONNAIRE - PHQ9
1. LITTLE INTEREST OR PLEASURE IN DOING THINGS: 0
SUM OF ALL RESPONSES TO PHQ QUESTIONS 1-9: 0
2. FEELING DOWN, DEPRESSED OR HOPELESS: 0
SUM OF ALL RESPONSES TO PHQ QUESTIONS 1-9: 0
SUM OF ALL RESPONSES TO PHQ9 QUESTIONS 1 & 2: 0

## 2024-01-08 NOTE — PROGRESS NOTES
Subjective  Yanet ANTWON Caballero, 53 y.o. female presents today with:  Chief Complaint   Patient presents with    URI     X2 days with congestion and body aches       HPI  Presents to  for sinus pain/pressure/drainage   Symptoms began Friday   C/o headache, body aches & otalgia of left ear   Denies drainage from ear   Nasal drainage clear to yellow/blood tinged at times   Used saline rinses for initial congestion   Cough d/t PND   Fatigued   Febrile over weekend Tmax 101F  Denies GI abnormalities   Eating and drinking   Sleep uninterrupted                   Past Medical History:   Diagnosis Date    Hypertension       Past Surgical History:   Procedure Laterality Date    BREAST ENHANCEMENT SURGERY Bilateral     saline     SECTION      OTHER SURGICAL HISTORY Bilateral     breast augmentation    UPPP UVULOPALATOPHARYGOPLASTY       Family History   Problem Relation Age of Onset    Breast Cancer Mother     Breast Cancer Daughter     Breast Cancer Maternal Grandmother     Breast Cancer Paternal Grandmother              Review of Systems   Constitutional:  Positive for chills, fatigue and fever (T101F). Negative for activity change and appetite change.   HENT:  Positive for congestion, ear pain (left), postnasal drip and rhinorrhea. Negative for sore throat.    Respiratory:  Positive for cough. Negative for chest tightness and shortness of breath.    Gastrointestinal:  Negative for abdominal pain, diarrhea and nausea.   Musculoskeletal:  Positive for myalgias. Negative for arthralgias.   Neurological:  Positive for headaches. Negative for dizziness and light-headedness.   Psychiatric/Behavioral:  Negative for sleep disturbance.          PMH, Surgical Hx, Family Hx, and Social Hx reviewed and updated.            Objective  Vitals:    24 1051   BP: 116/86   Site: Left Upper Arm   Position: Sitting   Cuff Size: Large Adult   Pulse: 81   Resp: 16   Temp: 98 °F (36.7 °C)   SpO2: 97%   Weight: 58.1 kg (128 lb)

## 2024-06-04 ENCOUNTER — TELEPHONE (OUTPATIENT)
Dept: VASCULAR SURGERY | Facility: HOSPITAL | Age: 54
End: 2024-06-04
Payer: COMMERCIAL

## 2024-06-04 NOTE — TELEPHONE ENCOUNTER
I have attempted to contact    Mrs. Hernandes     . There is no answer at the following phone number  [phone]      . I have left a voice mail message for the patient to contact Dr. Swartz's  office nurse at [phone]     Dr. Swartz will be out of the office 6/20/2024  She will need to reschedule her appointment. to schedule a follow up appointment.  Kayley Riley RN BSN

## 2024-06-10 ENCOUNTER — TELEPHONE (OUTPATIENT)
Dept: VASCULAR SURGERY | Facility: HOSPITAL | Age: 54
End: 2024-06-10
Payer: COMMERCIAL

## 2024-06-10 NOTE — TELEPHONE ENCOUNTER
I have attempted to contact    Mrs. Hernandes                  . There is no answer at the following phone number [phone]    . I have left a voice mail message for the patient to contact Dr. Swartz's  office nurse at [phone] to reschedule  her appointment for 6/20/2024    Dr. Swartz will not be in the office 6/20/2024 in the afternoon.  Kayley Riley RN BSN

## 2024-06-17 ENCOUNTER — APPOINTMENT (OUTPATIENT)
Dept: CARDIOLOGY | Facility: CLINIC | Age: 54
End: 2024-06-17
Payer: COMMERCIAL

## 2024-06-17 ENCOUNTER — TELEPHONE (OUTPATIENT)
Dept: CARDIOLOGY | Facility: CLINIC | Age: 54
End: 2024-06-17

## 2024-06-17 VITALS
BODY MASS INDEX: 23.42 KG/M2 | WEIGHT: 132.2 LBS | DIASTOLIC BLOOD PRESSURE: 88 MMHG | HEART RATE: 59 BPM | SYSTOLIC BLOOD PRESSURE: 136 MMHG | HEIGHT: 63 IN

## 2024-06-17 DIAGNOSIS — I73.9 PAD (PERIPHERAL ARTERY DISEASE) (CMS-HCC): ICD-10-CM

## 2024-06-17 DIAGNOSIS — G47.33 OSA (OBSTRUCTIVE SLEEP APNEA): ICD-10-CM

## 2024-06-17 DIAGNOSIS — F17.200 TOBACCO USE DISORDER: ICD-10-CM

## 2024-06-17 DIAGNOSIS — J38.3 SPASMODIC DYSPHONIA: ICD-10-CM

## 2024-06-17 DIAGNOSIS — I34.1 MITRAL VALVE PROLAPSE: ICD-10-CM

## 2024-06-17 DIAGNOSIS — M79.675 PAIN IN TOE OF LEFT FOOT: ICD-10-CM

## 2024-06-17 DIAGNOSIS — Z76.89 ENCOUNTER TO ESTABLISH CARE WITH NEW DOCTOR: ICD-10-CM

## 2024-06-17 DIAGNOSIS — I47.10 SVT (SUPRAVENTRICULAR TACHYCARDIA) (CMS-HCC): ICD-10-CM

## 2024-06-17 DIAGNOSIS — E78.2 MIXED HYPERLIPIDEMIA: ICD-10-CM

## 2024-06-17 DIAGNOSIS — I10 ESSENTIAL HYPERTENSION: ICD-10-CM

## 2024-06-17 DIAGNOSIS — I73.00 RAYNAUD'S PHENOMENON WITHOUT GANGRENE: ICD-10-CM

## 2024-06-17 PROBLEM — F41.9 ANXIETY: Status: ACTIVE | Noted: 2019-05-11

## 2024-06-17 PROBLEM — R00.0 TACHYCARDIA WITH HEART RATE 100-120 BEATS PER MINUTE: Status: ACTIVE | Noted: 2024-06-17

## 2024-06-17 PROCEDURE — 93000 ELECTROCARDIOGRAM COMPLETE: CPT | Performed by: STUDENT IN AN ORGANIZED HEALTH CARE EDUCATION/TRAINING PROGRAM

## 2024-06-17 PROCEDURE — 3075F SYST BP GE 130 - 139MM HG: CPT | Performed by: STUDENT IN AN ORGANIZED HEALTH CARE EDUCATION/TRAINING PROGRAM

## 2024-06-17 PROCEDURE — 99204 OFFICE O/P NEW MOD 45 MIN: CPT | Performed by: STUDENT IN AN ORGANIZED HEALTH CARE EDUCATION/TRAINING PROGRAM

## 2024-06-17 PROCEDURE — 3079F DIAST BP 80-89 MM HG: CPT | Performed by: STUDENT IN AN ORGANIZED HEALTH CARE EDUCATION/TRAINING PROGRAM

## 2024-06-17 RX ORDER — CONJUGATED ESTROGENS AND MEDROXYPROGESTERONE ACETATE .3; 1.5 MG/1; MG/1
1 TABLET, SUGAR COATED ORAL DAILY
COMMUNITY

## 2024-06-17 RX ORDER — AMLODIPINE BESYLATE 2.5 MG/1
2.5 TABLET ORAL DAILY
COMMUNITY
Start: 2023-10-31

## 2024-06-17 RX ORDER — ESTRADIOL 0.1 MG/G
1 CREAM VAGINAL 2 TIMES WEEKLY
COMMUNITY
Start: 2023-07-26

## 2024-06-17 RX ORDER — NAPROXEN SODIUM 220 MG/1
81 TABLET, FILM COATED ORAL DAILY
Qty: 30 TABLET | Refills: 11 | OUTPATIENT
Start: 2024-06-17 | End: 2024-06-21 | Stop reason: SINTOL

## 2024-06-17 RX ORDER — METOPROLOL SUCCINATE 50 MG/1
50 TABLET, EXTENDED RELEASE ORAL DAILY
COMMUNITY

## 2024-06-17 RX ORDER — IBUPROFEN 800 MG/1
800 TABLET ORAL AS NEEDED
COMMUNITY
Start: 2023-02-08

## 2024-06-17 NOTE — H&P (VIEW-ONLY)
Referred by Mamadou Youssef*  Chief complaint:   Chief Complaint   Patient presents with    New Patient Visit        History of Present Illness  Montserrat Hernandes is a 54 y.o. year old female patient with history of HTN and hyperlipidemia who is presenting for cardiovascular evaluation.    Reported episode of chest discomfort in 2023 and patient was told she had SVT at that time- Holter completed; I do not have access to these records at this time.     She states she was told she had Raynaud's phenomenon.  Also reports she had adverse reaction to ciprofloxacin with vasculitis.    Patient referred to clinic for follow up on pain and discoloration to the left 5th toe. Patient states pain has been present for 6 months, rating 6/10 discomfort. Worse with regular activity and alleviated by rest. States she is only able to walk 3 steps before discomfort is limiting- pain radiating up to calf and back of thigh.   Reports a prior history of intermittent palpitations    Social History     Tobacco Use    Smoking status: Every Day     Types: Cigarettes    Smokeless tobacco: Never    Tobacco comments:     She has greatly reduced her smoking and is actively trying to quit- currently smokes 2-3 cigarettes per day   Substance Use Topics    Alcohol use: Never    Drug use: Never       Outpatient Medications:  Current Outpatient Medications   Medication Instructions    amLODIPine (NORVASC) 2.5 mg, oral, Daily    estradiol (ESTRACE) 1 g, vaginal, 2 times weekly    ibuprofen 800 mg, oral, As needed    metoprolol succinate XL (TOPROL-XL) 50 mg, oral, Daily    Prempro 0.3-1.5 mg tablet 1 tablet, oral, Daily         Vitals:  Vitals:    06/17/24 0850   BP: 136/88   Pulse: 59       Physical Exam:  General: NAD, well-appearing  HEENT: moist mucous membranes, no jaundice  Neck: No JVD, no carotid bruit  Lungs: CTA gentry, no wheezing or rales  Cardiac: RRR, no murmurs  Abdomen: soft, non-tender, non-distended  Extremities: 2+  radial pulses, no edema, 1+ DP right pulses, unable to finds signals on left foot,  2+ femoral pulse on right, decreased pulse left femoral  Skin: warm, dry, no wound  Neurologic: AAOx3,  no focal deficits                 Reviewed Study(s):    Echo- 8/10/23:  -Left Ventricle: Normal left ventricular systolic function with a visually estimated EF of 60 - 65%. Left ventricle size is normal. Normal wall thickness. Normal wall motion. Normal diastolic function.   -Tricuspid Valve: Normal RVSP.     Exercise Nuclear 8/10/23:  -Stress Combined Conclusion: Normal pharmacological myocardial perfusion study. Findings suggest a low risk of cardiac events.   -Stress Function: Left ventricular function post-stress is normal.   Post-stress ejection fraction is 74%.   -ECG: The ECG was negative for ischemia.   -Stress Test: A Rajinder protocol stress test was performed. Overall, the  patient's exercise capacity was above average for their age. The patient was stressed for 9 min and 23 sec. The patient experienced no angina during the test. Hemodynamics are adequate for diagnosis. Blood pressure   demonstrated a normal response and heart rate demonstrated a normal response to stress. The patient's heart rate recovery was abnormal.     Lipid 2/2023:  TC- 215  Tri- 149  HDL- 34  LDL- 151    Assessment/Plan   Diagnoses and all orders for this visit:  Encounter to establish care with new doctor  PAD (peripheral artery disease) (CMS-HCC)  Pain in toe of left foot  Raynaud's phenomenon without gangrene  Mixed hyperlipidemia  Mitral valve prolapse  Essential hypertension  Spasmodic dysphonia  MARCO (obstructive sleep apnea)  Tobacco use disorder      #PAD  -rest pain and ischemic changes to left foot  -Symptoms concerning for more proximal inflow occlusion with likely embolic phenomena  -Will obtain KAISER/TBI and CTA with runoff bilaterally   -Start aspirin 81 mg daily.  Will also start high intensity statin  -Encouraged smoking  cessation    #Smoking Cessation  -Referral to smoking cessation program    # History of palpitations  -Would like to rule out possible cardioembolic disease.  Will obtain Holter monitor and echo with bubble study    Follow-up after results    Dotty Blas MD [facility]  Interventional Cardiology  Endovascular Interventions  [email or web address]    **Disclaimer: This note was dictated by speech recognition, and every effort has been made to prevent any error in transcription, however minor errors may be present**

## 2024-06-17 NOTE — PATIENT INSTRUCTIONS
Patient to follow up in 1 month with Dr. Suman Blas MD [facility]     Please START Aspirin 81mg once daily    Office will arrange Vascular KAISER/TBI testing of legs, in addition to CT Angiogram of legs with runoff.   Please complete lab work prior to Catscan. Orders in system.     Office will also arrange 14 day Zio Patch, and Echo with BUBBLE study of your heart.     Encouraged to quit smoking- heart healthy education given today.      No other changes today.   Continue same medications and treatments with above changes noted.  Patient educated on proper medication use.   Patient educated on risk factor modification.   Please bring any lab results from other providers / physicians to your next appointment.     Please bring all medicines, vitamins, and herbal supplements with you when you come to the office.     Prescriptions will not be filled unless you are compliant with your follow up appointments or have a follow up appointment scheduled as per instruction of your physician. Refills should be requested at the time of your visit.    Gal MARSHALL RN am scribing for and in the presence of Dr. Suman Blas MD [facility]

## 2024-06-17 NOTE — PROGRESS NOTES
Referred by Mamadou Youssef*  Chief complaint:   Chief Complaint   Patient presents with    New Patient Visit        History of Present Illness  Montserrat Hernandes is a 54 y.o. year old female patient with history of HTN and hyperlipidemia who is presenting for cardiovascular evaluation.    Reported episode of chest discomfort in 2023 and patient was told she had SVT at that time- Holter completed; I do not have access to these records at this time.     She states she was told she had Raynaud's phenomenon.  Also reports she had adverse reaction to ciprofloxacin with vasculitis.    Patient referred to clinic for follow up on pain and discoloration to the left 5th toe. Patient states pain has been present for 6 months, rating 6/10 discomfort. Worse with regular activity and alleviated by rest. States she is only able to walk 3 steps before discomfort is limiting- pain radiating up to calf and back of thigh.   Reports a prior history of intermittent palpitations    Social History     Tobacco Use    Smoking status: Every Day     Types: Cigarettes    Smokeless tobacco: Never    Tobacco comments:     She has greatly reduced her smoking and is actively trying to quit- currently smokes 2-3 cigarettes per day   Substance Use Topics    Alcohol use: Never    Drug use: Never       Outpatient Medications:  Current Outpatient Medications   Medication Instructions    amLODIPine (NORVASC) 2.5 mg, oral, Daily    estradiol (ESTRACE) 1 g, vaginal, 2 times weekly    ibuprofen 800 mg, oral, As needed    metoprolol succinate XL (TOPROL-XL) 50 mg, oral, Daily    Prempro 0.3-1.5 mg tablet 1 tablet, oral, Daily         Vitals:  Vitals:    06/17/24 0850   BP: 136/88   Pulse: 59       Physical Exam:  General: NAD, well-appearing  HEENT: moist mucous membranes, no jaundice  Neck: No JVD, no carotid bruit  Lungs: CTA gentry, no wheezing or rales  Cardiac: RRR, no murmurs  Abdomen: soft, non-tender, non-distended  Extremities: 2+  radial pulses, no edema, 1+ DP right pulses, unable to finds signals on left foot,  2+ femoral pulse on right, decreased pulse left femoral  Skin: warm, dry, no wound  Neurologic: AAOx3,  no focal deficits                 Reviewed Study(s):    Echo- 8/10/23:  -Left Ventricle: Normal left ventricular systolic function with a visually estimated EF of 60 - 65%. Left ventricle size is normal. Normal wall thickness. Normal wall motion. Normal diastolic function.   -Tricuspid Valve: Normal RVSP.     Exercise Nuclear 8/10/23:  -Stress Combined Conclusion: Normal pharmacological myocardial perfusion study. Findings suggest a low risk of cardiac events.   -Stress Function: Left ventricular function post-stress is normal.   Post-stress ejection fraction is 74%.   -ECG: The ECG was negative for ischemia.   -Stress Test: A Rajinder protocol stress test was performed. Overall, the  patient's exercise capacity was above average for their age. The patient was stressed for 9 min and 23 sec. The patient experienced no angina during the test. Hemodynamics are adequate for diagnosis. Blood pressure   demonstrated a normal response and heart rate demonstrated a normal response to stress. The patient's heart rate recovery was abnormal.     Lipid 2/2023:  TC- 215  Tri- 149  HDL- 34  LDL- 151    Assessment/Plan   Diagnoses and all orders for this visit:  Encounter to establish care with new doctor  PAD (peripheral artery disease) (CMS-HCC)  Pain in toe of left foot  Raynaud's phenomenon without gangrene  Mixed hyperlipidemia  Mitral valve prolapse  Essential hypertension  Spasmodic dysphonia  MARCO (obstructive sleep apnea)  Tobacco use disorder      #PAD  -rest pain and ischemic changes to left foot  -Symptoms concerning for more proximal inflow occlusion with likely embolic phenomena  -Will obtain KAISER/TBI and CTA with runoff bilaterally   -Start aspirin 81 mg daily.  Will also start high intensity statin  -Encouraged smoking  cessation    #Smoking Cessation  -Referral to smoking cessation program    # History of palpitations  -Would like to rule out possible cardioembolic disease.  Will obtain Holter monitor and echo with bubble study    Follow-up after results    Dotty Blas MD [facility]  Interventional Cardiology  Endovascular Interventions  [email or web address]    **Disclaimer: This note was dictated by speech recognition, and every effort has been made to prevent any error in transcription, however minor errors may be present**

## 2024-06-18 ENCOUNTER — TELEPHONE (OUTPATIENT)
Dept: CARDIOLOGY | Facility: CLINIC | Age: 54
End: 2024-06-18
Payer: COMMERCIAL

## 2024-06-18 NOTE — TELEPHONE ENCOUNTER
Patient called and LM stating she was advised by Dr. Suman Blas MD, FAC, FSCAI, RPVI yesterday to start taking ASA 81 mg. She started taking it yesterday and about 1.5 hours after she had vaginal bleeding and wants to know if she is supposed to continue taking the ASA. Routed to Gal Rivas RN

## 2024-06-20 ENCOUNTER — APPOINTMENT (OUTPATIENT)
Dept: VASCULAR SURGERY | Facility: CLINIC | Age: 54
End: 2024-06-20
Payer: COMMERCIAL

## 2024-06-20 NOTE — TELEPHONE ENCOUNTER
I LM for patient to return call to triage additional information.   Is she still menstruating? How heavy? When was last menses?    Has she taken ASA any other days or just the first day?

## 2024-06-21 NOTE — TELEPHONE ENCOUNTER
Patient returned call. Patient stated she is menopausal. Went into menopause in 2020. She has been taking the ASA everyday. The blood has a dried blood look to it, pepper brown, not like a period, does get a little on her panty liner. Routed to Gal Rivas RN

## 2024-06-21 NOTE — TELEPHONE ENCOUNTER
Patient called and LM stating she was returning our call. Attempted to call patient back to obtain more information. No answer at both numbers, LM on cell phone to call office back. Voicemail full on home phone and unable to LM there. Routed to Gal Rivas RN

## 2024-06-21 NOTE — TELEPHONE ENCOUNTER
Per Dr. Suman Blas MD [facility] RPVI, patient okay to hold ASA moving forward.     [employer] sent to patient with information.   Med list updated.

## 2024-06-24 NOTE — TELEPHONE ENCOUNTER
Patient has not read [employer] message sent on Friday.   I attempted to call patient today, VM is full. Unable to leave message.

## 2024-06-28 ENCOUNTER — LAB (OUTPATIENT)
Dept: LAB | Facility: LAB | Age: 54
End: 2024-06-28
Payer: COMMERCIAL

## 2024-06-28 DIAGNOSIS — I73.9 PAD (PERIPHERAL ARTERY DISEASE) (CMS-HCC): ICD-10-CM

## 2024-06-28 LAB
CREAT SERPL-MCNC: 0.8 MG/DL (ref 0.5–1.05)
EGFRCR SERPLBLD CKD-EPI 2021: 87 ML/MIN/1.73M*2

## 2024-06-28 PROCEDURE — 82565 ASSAY OF CREATININE: CPT

## 2024-06-28 PROCEDURE — 36415 COLL VENOUS BLD VENIPUNCTURE: CPT

## 2024-07-03 ENCOUNTER — HOSPITAL ENCOUNTER (OUTPATIENT)
Dept: RADIOLOGY | Facility: HOSPITAL | Age: 54
Discharge: HOME | End: 2024-07-03
Payer: COMMERCIAL

## 2024-07-03 DIAGNOSIS — I73.9 PAD (PERIPHERAL ARTERY DISEASE) (CMS-HCC): ICD-10-CM

## 2024-07-03 DIAGNOSIS — I73.00 RAYNAUD'S PHENOMENON WITHOUT GANGRENE: ICD-10-CM

## 2024-07-03 DIAGNOSIS — M79.675 PAIN IN TOE OF LEFT FOOT: ICD-10-CM

## 2024-07-03 DIAGNOSIS — E78.2 MIXED HYPERLIPIDEMIA: ICD-10-CM

## 2024-07-03 PROCEDURE — 75635 CT ANGIO ABDOMINAL ARTERIES: CPT

## 2024-07-03 PROCEDURE — 2550000001 HC RX 255 CONTRASTS: Performed by: STUDENT IN AN ORGANIZED HEALTH CARE EDUCATION/TRAINING PROGRAM

## 2024-07-03 PROCEDURE — 93922 UPR/L XTREMITY ART 2 LEVELS: CPT | Performed by: SURGERY

## 2024-07-03 PROCEDURE — 93922 UPR/L XTREMITY ART 2 LEVELS: CPT

## 2024-07-09 ENCOUNTER — PREP FOR PROCEDURE (OUTPATIENT)
Dept: CARDIOLOGY | Facility: CLINIC | Age: 54
End: 2024-07-09
Payer: COMMERCIAL

## 2024-07-09 ENCOUNTER — TELEPHONE (OUTPATIENT)
Dept: CARDIOLOGY | Facility: CLINIC | Age: 54
End: 2024-07-09
Payer: COMMERCIAL

## 2024-07-09 DIAGNOSIS — I73.9 PAD (PERIPHERAL ARTERY DISEASE) (CMS-HCC): ICD-10-CM

## 2024-07-09 DIAGNOSIS — I70.222 ATHEROSCLEROSIS OF NATIVE ARTERY OF LEFT LOWER EXTREMITY WITH REST PAIN (MULTI): Primary | ICD-10-CM

## 2024-07-09 DIAGNOSIS — E78.2 MIXED HYPERLIPIDEMIA: ICD-10-CM

## 2024-07-09 DIAGNOSIS — R68.89 ABNORMAL ANKLE BRACHIAL INDEX (ABI): ICD-10-CM

## 2024-07-09 NOTE — TELEPHONE ENCOUNTER
Per Dr. Suman Blas MD [facility], patient to be scheduled for Bilateral peripheral angiography with her on 7/12/24    Routine preop lab work to be done. Orders placed.   No medications need held.   Orders placed and sent to provider for signature.     Secretaries are aware to schedule.

## 2024-07-10 PROBLEM — I70.222 ATHEROSCLEROSIS OF NATIVE ARTERY OF LEFT LOWER EXTREMITY WITH REST PAIN (MULTI): Status: ACTIVE | Noted: 2024-07-09

## 2024-07-10 RX ORDER — SODIUM CHLORIDE 9 MG/ML
100 INJECTION, SOLUTION INTRAVENOUS CONTINUOUS
Status: CANCELLED | OUTPATIENT
Start: 2024-07-10

## 2024-07-11 ENCOUNTER — LAB (OUTPATIENT)
Dept: LAB | Facility: LAB | Age: 54
End: 2024-07-11
Payer: COMMERCIAL

## 2024-07-11 DIAGNOSIS — I73.9 PAD (PERIPHERAL ARTERY DISEASE) (CMS-HCC): ICD-10-CM

## 2024-07-11 DIAGNOSIS — R68.89 ABNORMAL ANKLE BRACHIAL INDEX (ABI): ICD-10-CM

## 2024-07-11 DIAGNOSIS — E78.2 MIXED HYPERLIPIDEMIA: ICD-10-CM

## 2024-07-11 LAB
ANION GAP SERPL CALC-SCNC: 10 MMOL/L (ref 10–20)
BUN SERPL-MCNC: 9 MG/DL (ref 6–23)
CALCIUM SERPL-MCNC: 9.3 MG/DL (ref 8.6–10.3)
CHLORIDE SERPL-SCNC: 108 MMOL/L (ref 98–107)
CO2 SERPL-SCNC: 27 MMOL/L (ref 21–32)
CREAT SERPL-MCNC: 0.83 MG/DL (ref 0.5–1.05)
EGFRCR SERPLBLD CKD-EPI 2021: 83 ML/MIN/1.73M*2
ERYTHROCYTE [DISTWIDTH] IN BLOOD BY AUTOMATED COUNT: 13 % (ref 11.5–14.5)
GLUCOSE SERPL-MCNC: 103 MG/DL (ref 74–99)
HCT VFR BLD AUTO: 43.7 % (ref 36–46)
HGB BLD-MCNC: 14.1 G/DL (ref 12–16)
INR PPP: 1 (ref 0.9–1.1)
MCH RBC QN AUTO: 31.5 PG (ref 26–34)
MCHC RBC AUTO-ENTMCNC: 32.3 G/DL (ref 32–36)
MCV RBC AUTO: 98 FL (ref 80–100)
NRBC BLD-RTO: 0 /100 WBCS (ref 0–0)
PLATELET # BLD AUTO: 326 X10*3/UL (ref 150–450)
POTASSIUM SERPL-SCNC: 4.2 MMOL/L (ref 3.5–5.3)
PROTHROMBIN TIME: 10.8 SECONDS (ref 9.8–12.8)
RBC # BLD AUTO: 4.48 X10*6/UL (ref 4–5.2)
SODIUM SERPL-SCNC: 141 MMOL/L (ref 136–145)
WBC # BLD AUTO: 7.4 X10*3/UL (ref 4.4–11.3)

## 2024-07-11 PROCEDURE — 85027 COMPLETE CBC AUTOMATED: CPT

## 2024-07-11 PROCEDURE — 36415 COLL VENOUS BLD VENIPUNCTURE: CPT

## 2024-07-11 PROCEDURE — 80048 BASIC METABOLIC PNL TOTAL CA: CPT

## 2024-07-11 PROCEDURE — 85610 PROTHROMBIN TIME: CPT

## 2024-07-11 RX ORDER — NAPROXEN SODIUM 220 MG/1
81 TABLET, FILM COATED ORAL DAILY
COMMUNITY

## 2024-07-12 ENCOUNTER — APPOINTMENT (OUTPATIENT)
Dept: CARDIOLOGY | Facility: HOSPITAL | Age: 54
End: 2024-07-12
Payer: COMMERCIAL

## 2024-07-12 ENCOUNTER — HOSPITAL ENCOUNTER (OUTPATIENT)
Facility: HOSPITAL | Age: 54
Setting detail: OUTPATIENT SURGERY
Discharge: HOME | End: 2024-07-12
Attending: STUDENT IN AN ORGANIZED HEALTH CARE EDUCATION/TRAINING PROGRAM | Admitting: STUDENT IN AN ORGANIZED HEALTH CARE EDUCATION/TRAINING PROGRAM
Payer: COMMERCIAL

## 2024-07-12 VITALS
RESPIRATION RATE: 20 BRPM | SYSTOLIC BLOOD PRESSURE: 149 MMHG | DIASTOLIC BLOOD PRESSURE: 76 MMHG | TEMPERATURE: 97.3 F | OXYGEN SATURATION: 99 % | BODY MASS INDEX: 23.16 KG/M2 | HEIGHT: 63 IN | HEART RATE: 72 BPM | WEIGHT: 130.73 LBS

## 2024-07-12 DIAGNOSIS — I70.222 ATHEROSCLEROSIS OF NATIVE ARTERY OF LEFT LOWER EXTREMITY WITH REST PAIN (MULTI): ICD-10-CM

## 2024-07-12 DIAGNOSIS — R68.89 ABNORMAL ANKLE BRACHIAL INDEX (ABI): ICD-10-CM

## 2024-07-12 DIAGNOSIS — E78.2 MIXED HYPERLIPIDEMIA: ICD-10-CM

## 2024-07-12 DIAGNOSIS — I73.9 PAD (PERIPHERAL ARTERY DISEASE) (CMS-HCC): Primary | ICD-10-CM

## 2024-07-12 LAB
ALBUMIN SERPL BCP-MCNC: 4 G/DL (ref 3.4–5)
ALP SERPL-CCNC: 78 U/L (ref 33–110)
ALT SERPL W P-5'-P-CCNC: 8 U/L (ref 7–45)
ANION GAP SERPL CALC-SCNC: 15 MMOL/L (ref 10–20)
AST SERPL W P-5'-P-CCNC: 14 U/L (ref 9–39)
B-HCG SERPL-ACNC: 3 MIU/ML
BILIRUB SERPL-MCNC: 0.5 MG/DL (ref 0–1.2)
BUN SERPL-MCNC: 7 MG/DL (ref 6–23)
CALCIUM SERPL-MCNC: 9.1 MG/DL (ref 8.6–10.3)
CHLORIDE SERPL-SCNC: 109 MMOL/L (ref 98–107)
CHOLEST SERPL-MCNC: 227 MG/DL (ref 0–199)
CHOLESTEROL/HDL RATIO: 5.9
CO2 SERPL-SCNC: 20 MMOL/L (ref 21–32)
CREAT SERPL-MCNC: 0.81 MG/DL (ref 0.5–1.05)
EGFRCR SERPLBLD CKD-EPI 2021: 86 ML/MIN/1.73M*2
GLUCOSE SERPL-MCNC: 100 MG/DL (ref 74–99)
HDLC SERPL-MCNC: 38.7 MG/DL
LDLC SERPL CALC-MCNC: 164 MG/DL
NON HDL CHOLESTEROL: 188 MG/DL (ref 0–149)
POTASSIUM SERPL-SCNC: 4 MMOL/L (ref 3.5–5.3)
PROT SERPL-MCNC: 6.6 G/DL (ref 6.4–8.2)
SODIUM SERPL-SCNC: 140 MMOL/L (ref 136–145)
TRIGL SERPL-MCNC: 123 MG/DL (ref 0–149)
VLDL: 25 MG/DL (ref 0–40)

## 2024-07-12 PROCEDURE — 99153 MOD SED SAME PHYS/QHP EA: CPT | Performed by: STUDENT IN AN ORGANIZED HEALTH CARE EDUCATION/TRAINING PROGRAM

## 2024-07-12 PROCEDURE — 75630 X-RAY AORTA LEG ARTERIES: CPT | Mod: 59 | Performed by: STUDENT IN AN ORGANIZED HEALTH CARE EDUCATION/TRAINING PROGRAM

## 2024-07-12 PROCEDURE — 37184 PRIM ART M-THRMBC 1ST VSL: CPT | Performed by: STUDENT IN AN ORGANIZED HEALTH CARE EDUCATION/TRAINING PROGRAM

## 2024-07-12 PROCEDURE — 37252 INTRVASC US NONCORONARY 1ST: CPT | Performed by: STUDENT IN AN ORGANIZED HEALTH CARE EDUCATION/TRAINING PROGRAM

## 2024-07-12 PROCEDURE — 2550000001 HC RX 255 CONTRASTS: Performed by: STUDENT IN AN ORGANIZED HEALTH CARE EDUCATION/TRAINING PROGRAM

## 2024-07-12 PROCEDURE — 80053 COMPREHEN METABOLIC PANEL: CPT | Performed by: NURSE PRACTITIONER

## 2024-07-12 PROCEDURE — 99152 MOD SED SAME PHYS/QHP 5/>YRS: CPT | Performed by: STUDENT IN AN ORGANIZED HEALTH CARE EDUCATION/TRAINING PROGRAM

## 2024-07-12 PROCEDURE — 2500000001 HC RX 250 WO HCPCS SELF ADMINISTERED DRUGS (ALT 637 FOR MEDICARE OP): Performed by: STUDENT IN AN ORGANIZED HEALTH CARE EDUCATION/TRAINING PROGRAM

## 2024-07-12 PROCEDURE — C1769 GUIDE WIRE: HCPCS | Performed by: STUDENT IN AN ORGANIZED HEALTH CARE EDUCATION/TRAINING PROGRAM

## 2024-07-12 PROCEDURE — 2720000007 HC OR 272 NO HCPCS: Performed by: STUDENT IN AN ORGANIZED HEALTH CARE EDUCATION/TRAINING PROGRAM

## 2024-07-12 PROCEDURE — C1760 CLOSURE DEV, VASC: HCPCS | Performed by: STUDENT IN AN ORGANIZED HEALTH CARE EDUCATION/TRAINING PROGRAM

## 2024-07-12 PROCEDURE — 2780000003 HC OR 278 NO HCPCS: Performed by: STUDENT IN AN ORGANIZED HEALTH CARE EDUCATION/TRAINING PROGRAM

## 2024-07-12 PROCEDURE — C1894 INTRO/SHEATH, NON-LASER: HCPCS | Performed by: STUDENT IN AN ORGANIZED HEALTH CARE EDUCATION/TRAINING PROGRAM

## 2024-07-12 PROCEDURE — 85347 COAGULATION TIME ACTIVATED: CPT

## 2024-07-12 PROCEDURE — 7100000009 HC PHASE TWO TIME - INITIAL BASE CHARGE: Performed by: STUDENT IN AN ORGANIZED HEALTH CARE EDUCATION/TRAINING PROGRAM

## 2024-07-12 PROCEDURE — 80061 LIPID PANEL: CPT | Performed by: NURSE PRACTITIONER

## 2024-07-12 PROCEDURE — C1753 CATH, INTRAVAS ULTRASOUND: HCPCS | Performed by: STUDENT IN AN ORGANIZED HEALTH CARE EDUCATION/TRAINING PROGRAM

## 2024-07-12 PROCEDURE — 37221 PR REVSC OPN/PRQ ILIAC ART W/STNT PLMT & ANGIOPLSTY: CPT | Performed by: STUDENT IN AN ORGANIZED HEALTH CARE EDUCATION/TRAINING PROGRAM

## 2024-07-12 PROCEDURE — C1874 STENT, COATED/COV W/DEL SYS: HCPCS | Performed by: STUDENT IN AN ORGANIZED HEALTH CARE EDUCATION/TRAINING PROGRAM

## 2024-07-12 PROCEDURE — G0269 OCCLUSIVE DEVICE IN VEIN ART: HCPCS | Mod: TC | Performed by: STUDENT IN AN ORGANIZED HEALTH CARE EDUCATION/TRAINING PROGRAM

## 2024-07-12 PROCEDURE — 99024 POSTOP FOLLOW-UP VISIT: CPT | Performed by: NURSE PRACTITIONER

## 2024-07-12 PROCEDURE — 84702 CHORIONIC GONADOTROPIN TEST: CPT | Performed by: STUDENT IN AN ORGANIZED HEALTH CARE EDUCATION/TRAINING PROGRAM

## 2024-07-12 PROCEDURE — 7100000010 HC PHASE TWO TIME - EACH INCREMENTAL 1 MINUTE: Performed by: STUDENT IN AN ORGANIZED HEALTH CARE EDUCATION/TRAINING PROGRAM

## 2024-07-12 PROCEDURE — 76937 US GUIDE VASCULAR ACCESS: CPT | Performed by: STUDENT IN AN ORGANIZED HEALTH CARE EDUCATION/TRAINING PROGRAM

## 2024-07-12 PROCEDURE — 93005 ELECTROCARDIOGRAM TRACING: CPT

## 2024-07-12 PROCEDURE — C1887 CATHETER, GUIDING: HCPCS | Performed by: STUDENT IN AN ORGANIZED HEALTH CARE EDUCATION/TRAINING PROGRAM

## 2024-07-12 PROCEDURE — 2500000004 HC RX 250 GENERAL PHARMACY W/ HCPCS (ALT 636 FOR OP/ED): Performed by: STUDENT IN AN ORGANIZED HEALTH CARE EDUCATION/TRAINING PROGRAM

## 2024-07-12 PROCEDURE — 2500000005 HC RX 250 GENERAL PHARMACY W/O HCPCS: Performed by: STUDENT IN AN ORGANIZED HEALTH CARE EDUCATION/TRAINING PROGRAM

## 2024-07-12 PROCEDURE — 96373 THER/PROPH/DIAG INJ IA: CPT | Performed by: STUDENT IN AN ORGANIZED HEALTH CARE EDUCATION/TRAINING PROGRAM

## 2024-07-12 PROCEDURE — C1725 CATH, TRANSLUMIN NON-LASER: HCPCS | Performed by: STUDENT IN AN ORGANIZED HEALTH CARE EDUCATION/TRAINING PROGRAM

## 2024-07-12 PROCEDURE — C1757 CATH, THROMBECTOMY/EMBOLECT: HCPCS | Performed by: STUDENT IN AN ORGANIZED HEALTH CARE EDUCATION/TRAINING PROGRAM

## 2024-07-12 PROCEDURE — 37221 HC REVASCULARIZE ILIAC ARTERY,ANGIOPLASTY/STENT, INITIAL VESSEL: CPT | Performed by: STUDENT IN AN ORGANIZED HEALTH CARE EDUCATION/TRAINING PROGRAM

## 2024-07-12 PROCEDURE — 75630 X-RAY AORTA LEG ARTERIES: CPT | Performed by: STUDENT IN AN ORGANIZED HEALTH CARE EDUCATION/TRAINING PROGRAM

## 2024-07-12 PROCEDURE — 36415 COLL VENOUS BLD VENIPUNCTURE: CPT | Performed by: STUDENT IN AN ORGANIZED HEALTH CARE EDUCATION/TRAINING PROGRAM

## 2024-07-12 PROCEDURE — 93010 ELECTROCARDIOGRAM REPORT: CPT | Performed by: INTERNAL MEDICINE

## 2024-07-12 DEVICE — VIABAHN BX BALLOON EXP ENDO 7MMX79MM 7FR 135CMCATH HEPARIN
Type: IMPLANTABLE DEVICE | Site: LEG | Status: FUNCTIONAL
Brand: GORE VIABAHN VBX BALLOON EXPANDABLE ENDO

## 2024-07-12 RX ORDER — NAPROXEN SODIUM 220 MG/1
81 TABLET, FILM COATED ORAL DAILY
Status: DISCONTINUED | OUTPATIENT
Start: 2024-07-13 | End: 2024-07-12 | Stop reason: HOSPADM

## 2024-07-12 RX ORDER — ATORVASTATIN CALCIUM 80 MG/1
80 TABLET, FILM COATED ORAL DAILY
Qty: 30 TABLET | Refills: 1 | Status: SHIPPED | OUTPATIENT
Start: 2024-07-12 | End: 2024-09-10

## 2024-07-12 RX ORDER — FENTANYL CITRATE 50 UG/ML
INJECTION, SOLUTION INTRAMUSCULAR; INTRAVENOUS AS NEEDED
Status: DISCONTINUED | OUTPATIENT
Start: 2024-07-12 | End: 2024-07-12 | Stop reason: HOSPADM

## 2024-07-12 RX ORDER — IODIXANOL 320 MG/ML
INJECTION, SOLUTION INTRAVASCULAR AS NEEDED
Status: DISCONTINUED | OUTPATIENT
Start: 2024-07-12 | End: 2024-07-12 | Stop reason: HOSPADM

## 2024-07-12 RX ORDER — SODIUM CHLORIDE 9 MG/ML
100 INJECTION, SOLUTION INTRAVENOUS CONTINUOUS
Status: DISCONTINUED | OUTPATIENT
Start: 2024-07-12 | End: 2024-07-12

## 2024-07-12 RX ORDER — CLOPIDOGREL BISULFATE 300 MG/1
TABLET, FILM COATED ORAL AS NEEDED
Status: DISCONTINUED | OUTPATIENT
Start: 2024-07-12 | End: 2024-07-12 | Stop reason: HOSPADM

## 2024-07-12 RX ORDER — CLOPIDOGREL BISULFATE 75 MG/1
75 TABLET ORAL DAILY
Qty: 30 TABLET | Refills: 11 | Status: SHIPPED | OUTPATIENT
Start: 2024-07-13 | End: 2025-07-13

## 2024-07-12 RX ORDER — ACETAMINOPHEN 325 MG/1
650 TABLET ORAL EVERY 6 HOURS PRN
Status: DISCONTINUED | OUTPATIENT
Start: 2024-07-12 | End: 2024-07-12 | Stop reason: HOSPADM

## 2024-07-12 RX ORDER — SODIUM CHLORIDE 9 MG/ML
100 INJECTION, SOLUTION INTRAVENOUS CONTINUOUS
Status: DISCONTINUED | OUTPATIENT
Start: 2024-07-12 | End: 2024-07-12 | Stop reason: HOSPADM

## 2024-07-12 RX ORDER — MIDAZOLAM HYDROCHLORIDE 1 MG/ML
INJECTION, SOLUTION INTRAMUSCULAR; INTRAVENOUS AS NEEDED
Status: DISCONTINUED | OUTPATIENT
Start: 2024-07-12 | End: 2024-07-12 | Stop reason: HOSPADM

## 2024-07-12 RX ORDER — LIDOCAINE HYDROCHLORIDE 20 MG/ML
INJECTION, SOLUTION INFILTRATION; PERINEURAL AS NEEDED
Status: DISCONTINUED | OUTPATIENT
Start: 2024-07-12 | End: 2024-07-12 | Stop reason: HOSPADM

## 2024-07-12 RX ORDER — HEPARIN SODIUM 1000 [USP'U]/ML
INJECTION, SOLUTION INTRAVENOUS; SUBCUTANEOUS AS NEEDED
Status: DISCONTINUED | OUTPATIENT
Start: 2024-07-12 | End: 2024-07-12 | Stop reason: HOSPADM

## 2024-07-12 RX ORDER — CLOPIDOGREL BISULFATE 75 MG/1
75 TABLET ORAL DAILY
Status: DISCONTINUED | OUTPATIENT
Start: 2024-07-13 | End: 2024-07-12 | Stop reason: HOSPADM

## 2024-07-12 RX ORDER — ASPIRIN 325 MG
325 TABLET ORAL ONCE
Status: DISCONTINUED | OUTPATIENT
Start: 2024-07-12 | End: 2024-07-12

## 2024-07-12 ASSESSMENT — COLUMBIA-SUICIDE SEVERITY RATING SCALE - C-SSRS
1. IN THE PAST MONTH, HAVE YOU WISHED YOU WERE DEAD OR WISHED YOU COULD GO TO SLEEP AND NOT WAKE UP?: NO
6. HAVE YOU EVER DONE ANYTHING, STARTED TO DO ANYTHING, OR PREPARED TO DO ANYTHING TO END YOUR LIFE?: NO
2. HAVE YOU ACTUALLY HAD ANY THOUGHTS OF KILLING YOURSELF?: NO

## 2024-07-12 ASSESSMENT — PAIN SCALES - GENERAL
PAINLEVEL_OUTOF10: 0 - NO PAIN

## 2024-07-12 ASSESSMENT — PAIN - FUNCTIONAL ASSESSMENT
PAIN_FUNCTIONAL_ASSESSMENT: 0-10

## 2024-07-12 NOTE — POST-PROCEDURE NOTE
Physician Transition of Care Summary  Invasive Cardiovascular Lab    Procedure Date: 7/12/2024  Attending:    * Suman Garner - Primary  Resident/Fellow/Other Assistant: Surgeons and Role:  * No surgeons found with a matching role *    Indications:   Pre-op Diagnosis      * PAD (peripheral artery disease) (CMS-HCC) [I73.9]     * Atherosclerosis of native artery of left lower extremity with rest pain (Multi) [I70.222]    Post-procedure diagnosis:   Post-op Diagnosis     * PAD (peripheral artery disease) (CMS-HCC) [I73.9]     * Atherosclerosis of native artery of left lower extremity with rest pain (Multi) [I70.222]    Procedure(s):   Lower Extremity Angiogram  54773 - CHG ANGIOGRAPHY EXTREMITY UNILATERAL RS&I    Lower Extremity Intervention    IVUS noncoronary initial    Thrombectomy - Lower Extremity        Procedure Findings:   Left DAKOTA subacute total occlusion    Description of the Procedure:   Left DAKOTA thrombectomy   Left DAKOTA covered stent placement  Bilateral CFA access - R 5Fr, L 7Fr    Complications:   None    Stents/Implants:   Implants       Other Cardiac Implant    Stent, Viabahn Vbx Balloon, 7 X 79, 7fr 135cm Heparin - [id number] - [id number] - Implanted        Inventory item: STENT, VIABAHN VBX BALLOON, 7 X 79, 7FR 135CM HEPARIN Model/Cat number: [id number]    Serial number: [id number] : [employer]    Lot number: [id number] Device identifier: [id number]      As of 7/12/2024       Status: Implanted                              Anticoagulation/Antiplatelet Plan:   Continue aspirin and plavix    Estimated Blood Loss:   550 mL    Anesthesia: Moderate Sedation Anesthesia Staff: No anesthesia staff entered.    Any Specimen(s) Removed:   Order Name Source Comment Collection Info Order Time   HUMAN CHORIONIC GONADOTROPIN, SERUM QUANTITATIVE Blood, Venous  Collected By: Katina Casarez RN 7/12/2024  8:45 AM     Release result to [employer]   Immediate            Disposition:    Home  Follow up in office in 1 month with KAISER/TBI      Electronically signed by: Suman Garner MD, 7/12/2024 1:30 PM

## 2024-07-12 NOTE — DISCHARGE INSTRUCTIONS
CARDIAC CATHETERIZATION DISCHARGE INSTRUCTIONS     FOR SUDDEN AND SEVERE CHEST PAIN, SHORTNESS OF BREATH, EXCESSIVE BLEEDING, SIGNS OF STROKE, OR CHANGES IN MENTAL STATUS YOU SHOULD CALL 911 IMMEDIATELY.     If your provider has prescribed aspirin and/or clopidogrel (Plavix), or prasugrel (Effient), or ticagrelor (Brilinta), DO NOT STOP THESE MEDICATIONS for any reason without talking to your cardiologist first. If any of these were prescribed, you must take them every day without missing a single dose. If you are getting low on these medications, contact your provider immediately for a refill.     FOR NEXT 24 HOURS  - Upon discharge, you should return home and rest for the remainder of the day and evening. You do not have to stay on bed rest but should not be very active.  It is recommended a responsible adult be with you for the first 24 hours after the procedure.    - No driving for 24 hours after procedure. Please arrange for someone to drive you home from the hospital today.     - Do not drive, operate machinery, or use power tools for 24 hours after your procedure.     - Do not make any legal decisions for 24 hours after your procedure.     - Do not drink alcoholic beverages for 24 hours after your procedure.    WOUND CARE   *FOR FEMORAL (LEG) ACCESS*  ·      Avoid heavy lifting (over 10 pounds) for 7 days, squatting or excessive bending for 2 days, and strenuous exercise for 7 days.  ·      No submerged bathing, swimming, or hot tubs for the next 7 days, or until fully healed.  ·      Avoid sexual activity for 3-4 days until any groin discomfort has ceased.    - The transparent dressing should be removed from the site 24 hours after the procedure.  Wash the site gently with soap and water. Rinse well and pat dry. Keep the area clean and dry. Avoid lotions, ointments, or powders until fully healed.     - You may shower the day after your procedure.      - It is normal to notice a small bruise around  the puncture site and/or a small grape sized or smaller lump. Any large bruising or large lump warrants a call to the office.     - If bleeding should occur, lay down and apply pressure to the affected area for 10 minutes.  If the bleeding stops notify your physician.  If there is a large amount of bleeding or spurting of blood CALL 911 immediately.  DO NOT drive yourself to the hospital.    - You may experience some tenderness, bruising or minimal inflammation.  If you have any concerns, you may contact the Cath Lab or if any of these symptoms become excessive, contact your cardiologist or go to the emergency room.     OTHER INSTRUCTIONS  - You may take acetaminophen (Tylenol) as directed for discomfort.  If pain is not relieved with acetaminophen (Tylenol), contact your doctor.    - If you notice or experience any of the following, you should notify your doctor or seek medical attention  Chest pain or discomfort  Change in mental status or weakness in extremities.  Dizziness, light headedness, or feeling faint.  Change in the site where the procedure was performed, such as bleeding or an increased area of bruising or swelling.  Tingling, numbness, pain, or coolness in the leg/arm beyond the site where the procedure was performed.  Signs of infection (i.e. shaking chills, temperature > 100 degrees Fahrenheit, warmth, redness) in the leg/arm area where the procedure was performed.  Changes in urination   Bloody or black stools  Vomiting blood  Severe nose bleeds  Any excessive bleeding    - If you DO NOT have an appointment with your cardiologist within 4 weeks following your procedure, please contact their office.

## 2024-07-12 NOTE — NURSING NOTE
Discharge instructions reviewed with patient and spouse. Discussed in depth post procedure restrictions, new medications, cardiac rehab and follow up appointments. Cardiac rehab referral and stent card provided. Questions and concerns addressed. Bilateral groins sites remains stable. Plan to dc home.

## 2024-07-12 NOTE — NURSING NOTE
Patient returned from cath lab, s/p LE angiogram. Bilateral groin sites closed with vascades, CDI and soft. Bilateral pedal pulses present with doppler. Patient to remain flat at this time. Patient A&Ox3 and has no c/o at this time. Family at bedside. Will continue to monitor.

## 2024-07-12 NOTE — PROGRESS NOTES
Reviewed findings of lower extremity angiogram and PTA/stent placement of left common iliac artery with patient and  who is at the bedside in the postprocedural recovery unit.  Patient is resting comfortably and has no complaints.  Reviewed postprocedure activity restrictions, need for dual antiplatelet therapy with aspirin and Plavix, importance of smoking cessation, low-fat low-cholesterol diet, and continued medical management of peripheral arterial disease.  High intensity statin was initiated.  Patient will follow-up with Dr. Blas with PVR and office visit in 1 month.  They verbalized understanding of information.

## 2024-07-13 LAB
ATRIAL RATE: 53 BPM
P AXIS: 18 DEGREES
P OFFSET: 209 MS
P ONSET: 153 MS
PR INTERVAL: 148 MS
Q ONSET: 227 MS
QRS COUNT: 8 BEATS
QRS DURATION: 78 MS
QT INTERVAL: 414 MS
QTC CALCULATION(BAZETT): 388 MS
QTC FREDERICIA: 397 MS
R AXIS: 23 DEGREES
T AXIS: 69 DEGREES
T OFFSET: 434 MS
VENTRICULAR RATE: 53 BPM

## 2024-07-15 ENCOUNTER — DOCUMENTATION (OUTPATIENT)
Dept: CARDIOLOGY | Facility: HOSPITAL | Age: 54
End: 2024-07-15
Payer: COMMERCIAL

## 2024-07-15 ENCOUNTER — DOCUMENTATION (OUTPATIENT)
Dept: CARDIOLOGY | Facility: CLINIC | Age: 54
End: 2024-07-15
Payer: COMMERCIAL

## 2024-07-15 LAB — ACT BLD: 210 SEC (ref 89–169)

## 2024-07-15 NOTE — PROGRESS NOTES
07/12/24  Spoke to pt and family about the [employer] VBX Forward trial. I had left a message on 7/11/24 for the pt to discuss the trial and did not receive a return call. I met with the pt after she was admitted and she had her  and daughter with her. I discussed the focus of the trial, the risks and benefits , randomization procedure, that both devices in the trial are FDA approved, procedure, device features and follow-up. Pt stated that she remembered talking  with Dr Blas about it and that she would read through the consent with her , I answered all questions and told them I would return in an hour or so.  I returned in an hour and half and the pt  said she would not do the trial due to added cost for US ect and they had jsut been charged for one of those 2 weeks previously. I stated that I was pretty sure all of the f/u was standard of care and that all of the US and appt would be done even if pt was not in the trial, the only thing that was not standard of care was the questionnaires we have the pt do. I told him I would confirm this with the doctor and get back to them. I did confirm and went back to explain to the pt and her  that all f/u is standard of care and not for study reasons. They said they were then fine with signing for the trial. Pt signed consent and I went through I and E with her and answered all questions. Pt also did her questionnaires. I also signed and made a copy of the consent and gave a signed copy to the pt's  and also scanned one to Precious W to be put in the pt MR.   The [facility] representative, Dr Blas and I again reviewed the pt CT scan and called Félix with a couple a questions Dr GONZALEZ had.   The pt had the procedure and due to the lesion the pt was intra operatively excluded due to E-4 . I confirmed that with Dr Blas and went to speak to the family. I told the family and pt that she had a very successful procedure but due to one of the exclusions she was  not put into the trial. I told them Dr mast would be down to explain the procedure and thanked them for taking the time to speak with me and to call me with questions or concerns.

## 2024-07-17 ENCOUNTER — APPOINTMENT (OUTPATIENT)
Dept: CARDIOLOGY | Facility: HOSPITAL | Age: 54
End: 2024-07-17
Payer: COMMERCIAL

## 2024-07-18 ENCOUNTER — ANCILLARY PROCEDURE (OUTPATIENT)
Dept: CARDIOLOGY | Facility: HOSPITAL | Age: 54
End: 2024-07-18
Payer: COMMERCIAL

## 2024-07-18 ENCOUNTER — APPOINTMENT (OUTPATIENT)
Dept: CARDIOLOGY | Facility: CLINIC | Age: 54
End: 2024-07-18
Payer: COMMERCIAL

## 2024-07-18 DIAGNOSIS — I73.9 PAD (PERIPHERAL ARTERY DISEASE) (CMS-HCC): ICD-10-CM

## 2024-07-18 DIAGNOSIS — I10 ESSENTIAL HYPERTENSION: ICD-10-CM

## 2024-07-18 DIAGNOSIS — I47.10 SVT (SUPRAVENTRICULAR TACHYCARDIA) (CMS-HCC): ICD-10-CM

## 2024-07-18 DIAGNOSIS — I34.1 MITRAL VALVE PROLAPSE: ICD-10-CM

## 2024-07-18 PROCEDURE — 93306 TTE W/DOPPLER COMPLETE: CPT | Performed by: INTERNAL MEDICINE

## 2024-07-18 PROCEDURE — 93306 TTE W/DOPPLER COMPLETE: CPT

## 2024-07-18 PROCEDURE — 93272 ECG/REVIEW INTERPRET ONLY: CPT | Performed by: INTERNAL MEDICINE

## 2024-07-22 LAB
AORTIC VALVE MEAN GRADIENT: 4 MMHG
AORTIC VALVE PEAK VELOCITY: 1.43 M/S
AV PEAK GRADIENT: 8.2 MMHG
AVA (PEAK VEL): 3.22 CM2
AVA (VTI): 3.44 CM2
EJECTION FRACTION APICAL 4 CHAMBER: 64.6
EJECTION FRACTION: 70 %
LEFT VENTRICLE INTERNAL DIMENSION DIASTOLE: 3.28 CM (ref 3.5–6)
LEFT VENTRICULAR OUTFLOW TRACT DIAMETER: 2.2 CM
LV EJECTION FRACTION BIPLANE: 71 %
MITRAL VALVE E/A RATIO: 1.04
RIGHT VENTRICLE PEAK SYSTOLIC PRESSURE: 23.3 MMHG

## 2024-07-29 ENCOUNTER — APPOINTMENT (OUTPATIENT)
Dept: CARDIOLOGY | Facility: CLINIC | Age: 54
End: 2024-07-29
Payer: COMMERCIAL

## 2024-08-13 ENCOUNTER — HOSPITAL ENCOUNTER (OUTPATIENT)
Dept: RADIOLOGY | Facility: HOSPITAL | Age: 54
Discharge: HOME | End: 2024-08-13
Payer: COMMERCIAL

## 2024-08-13 DIAGNOSIS — I73.89 OTHER SPECIFIED PERIPHERAL VASCULAR DISEASES (CMS-HCC): ICD-10-CM

## 2024-08-13 DIAGNOSIS — I73.9 PAD (PERIPHERAL ARTERY DISEASE) (CMS-HCC): ICD-10-CM

## 2024-08-13 PROCEDURE — 93922 UPR/L XTREMITY ART 2 LEVELS: CPT

## 2024-08-19 ENCOUNTER — APPOINTMENT (OUTPATIENT)
Dept: CARDIOLOGY | Facility: CLINIC | Age: 54
End: 2024-08-19
Payer: COMMERCIAL

## 2024-08-21 ENCOUNTER — TELEPHONE (OUTPATIENT)
Dept: CARDIOLOGY | Facility: CLINIC | Age: 54
End: 2024-08-21
Payer: COMMERCIAL

## 2024-08-21 NOTE — TELEPHONE ENCOUNTER
----- Message from Suman Garner sent at 8/21/2024  3:02 PM EDT -----  Please let patient know normal echocardiogram, normal heart valves and no evidence of clots in the heart  ----- Message -----  From: Interface, Syngo - Cardiology Results In  Sent: 7/22/2024   9:54 PM EDT  To: Suman Garner MD

## 2024-08-26 ENCOUNTER — APPOINTMENT (OUTPATIENT)
Dept: CARDIOLOGY | Facility: CLINIC | Age: 54
End: 2024-08-26
Payer: COMMERCIAL

## 2024-08-26 VITALS
HEART RATE: 64 BPM | BODY MASS INDEX: 23.44 KG/M2 | SYSTOLIC BLOOD PRESSURE: 124 MMHG | DIASTOLIC BLOOD PRESSURE: 70 MMHG | WEIGHT: 132.3 LBS

## 2024-08-26 DIAGNOSIS — Z95.820 S/P PERCUTANEOUS TRANSLUMINAL ANGIOPLASTY (PTA) WITH STENT PLACEMENT: ICD-10-CM

## 2024-08-26 DIAGNOSIS — I10 ESSENTIAL HYPERTENSION: ICD-10-CM

## 2024-08-26 DIAGNOSIS — I73.9 PAD (PERIPHERAL ARTERY DISEASE) (CMS-HCC): ICD-10-CM

## 2024-08-26 DIAGNOSIS — F17.200 TOBACCO USE DISORDER: ICD-10-CM

## 2024-08-26 DIAGNOSIS — I70.222 ATHEROSCLEROSIS OF NATIVE ARTERY OF LEFT LOWER EXTREMITY WITH REST PAIN (MULTI): ICD-10-CM

## 2024-08-26 DIAGNOSIS — E78.2 MIXED HYPERLIPIDEMIA: ICD-10-CM

## 2024-08-26 RX ORDER — CHOLECALCIFEROL (VITAMIN D3) 50 MCG
2000 TABLET ORAL
COMMUNITY
Start: 2024-06-23

## 2024-08-26 NOTE — PATIENT INSTRUCTIONS
Patient to follow up in 6 months with Dr. Suman Blas MD [facility]     Office will arrange K[facility]ER/TBI and Aortic Iliac Duplex in 6 months for surveillance.     Please STOP Aspirin   Please START Xarelto 2.5mg twice daily   Continue with Plavix therapy.    Nicotine patch script given.     No other changes today.   Continue same medications and treatments with above changes today.   Patient educated on proper medication use.   Patient educated on risk factor modification.   Please bring any lab results from other providers / physicians to your next appointment.     Please bring all medicines, vitamins, and herbal supplements with you when you come to the office.     Prescriptions will not be filled unless you are compliant with your follow up appointments or have a follow up appointment scheduled as per instruction of your physician. Refills should be requested at the time of your visit.    Gal MARSHALL RN am scribing for and in the presence of Dr. Suman Blas MD [facility]

## 2024-08-26 NOTE — PROGRESS NOTES
Chief complaint:   Chief Complaint   Patient presents with    Hospital Follow-up     S/P peripheral angiogram         History of Present Illness  Montserrat Hernandes is a 54 y.o. year old female patient with history of  HTN, hyperlipidemia, smoking, and PAD who is presenting for cardiovascular evaluation.    Patient originally presented after having for 6 months new left 5th toe purplish discoloration and pain, as well as worsening lifestyle limiting claudication.    She is now status post left iliac artery revascularization with iliac thrombectomy and covered stenting.   Reports resolution of claudication symtpoms and improvement in appearance of toe.       Social History     Tobacco Use    Smoking status: Every Day     Types: Cigarettes    Smokeless tobacco: Never    Tobacco comments:     She has greatly reduced her smoking and is actively trying to quit- currently smokes 2-3 cigarettes per day   Substance Use Topics    Alcohol use: Never    Drug use: Never       Outpatient Medications:  Current Outpatient Medications   Medication Instructions    amLODIPine (NORVASC) 2.5 mg, oral, Daily    aspirin 81 mg, oral, Daily    atorvastatin (LIPITOR) 80 mg, oral, Daily, Please asked Dr. Blas for refill prescriptions if you are tolerating this medication    cholecalciferol (VITAMIN D-3) 2,000 Units, oral, Daily with breakfast    clopidogrel (PLAVIX) 75 mg, oral, Daily    estradiol (ESTRACE) 1 g, vaginal, 2 times weekly    ibuprofen 800 mg, oral, As needed    metoprolol succinate XL (TOPROL-XL) 50 mg, oral, Daily    Prempro 0.3-1.5 mg tablet 1 tablet, oral, Daily         Vitals:  Vitals:    08/26/24 1037   BP: 124/70   Pulse: 64       Physical Exam:  General: NAD, well-appearing  HEENT: moist mucous membranes, no jaundice  Neck: No JVD, no carotid bruit  Lungs: CTA gentry, no wheezing or rales  Cardiac: RRR, no murmurs  Abdomen: soft, non-tender, non-distended  Extremities: 2+ radial pulses, no edema, palpable 2+ pedal  pulses, mild 5th toe purplish discoloration.  Skin: warm, dry, no wound  Neurologic: AAOx3,  no focal deficits       Reviewed Study(s):    Post Procedure [facility] 8/13/24:  RIGHT Lower PVR                Pressures Ratios  Right Posterior Tibial (Ankle) 120 mmHg  0.88  Right Dorsalis Pedis (Ankle)   115 mmHg  0.84  Right Digit (Great Toe)        67 mmHg   0.49     LEFT Lower PVR                Pressures Ratios  Left Posterior Tibial (Ankle) 126 mmHg  0.92  Left Dorsalis Pedis (Ankle)   124 mmHg  0.91  Left Digit (Great Toe)        91 mmHg   0.66                        Right     Left  Brachial Pressure 137 mmHg 127 mmHg    Peripheral Angiogram 7/12/24:  1. Indication: CLTI R4.   2. Diagnostic aorto-bi iliac angiograrm.   3. IVUS of left iliac artery.   4. Percutaneous thrombectomy of left DAKOTA.   5. VBX covered stent placement (7x79 mm) in the left DAKOTA postdilated to 8mm proximally.    14 day Zio Patch 7/18/24:  Normal sinus rhythm with short bursts of supraventricular tachycardia appears to be most consistent with paroxysmal atrial tachycardia.  Longest run was 7 beats and fastest run was 150 bpm .  No atrial fibrillation.  Supraventricular ectopy and ventricular ectopy was less than 1% of total QRS complexes  No symptoms were recorded    Echo- 7/18/24:  1. The left ventricular systolic function is normal, with a visually estimated ejection fraction of 70%.   2. There is normal right ventricular global systolic function.   3. There is no evidence of a patent foramen ovale.   4. Normal valve structure and function.   5. Normal echocardiogram.     Exercise Nuclear 8/10/23:  -Stress Combined Conclusion: Normal pharmacological myocardial perfusion study. Findings suggest a low risk of cardiac events.   -Stress Function: Left ventricular function post-stress is normal.   Post-stress ejection fraction is 74%.   -ECG: The ECG was negative for ischemia.   -Stress Test: A Rajinder protocol stress test was performed. Overall, the   patient's exercise capacity was above average for their age. The patient was stressed for 9 min and 23 sec. The patient experienced no angina during the test. Hemodynamics are adequate for diagnosis. Blood pressure   demonstrated a normal response and heart rate demonstrated a normal response to stress. The patient's heart rate recovery was abnormal.      Lipid 2/2023:  TC- 227  Tri- 123  HDL- 38  LDL- 164       Assessment/Plan   Diagnoses and all orders for this visit:  PAD (peripheral artery disease) (CMS-[facility])  Atherosclerosis of native artery of left lower extremity with rest pain (Multi)  Mixed hyperlipidemia  Essential hypertension  Tobacco use disorder  BMI 23.0-23.9, adult  S/P percutaneous transluminal angioplasty (PTA) with stent placement      #PAD  -left thigh claudication and 5th toe discoloration  -Status post left iliac revascularization, significant resolution of symptoms, 2+ pedal pulses gentry, mild residual purplish discoloration on 5th toe  -Stop Aspirin, start low dose Xarelto 2.5mg BID. Continue Plavix   -Encouraged smoking cessation  -Surveillance Aorto/Iliac Duplex with [facility]BI in 6 months      #Smoking Cessation  -Referral to smoking cessation program  -Nicotine Patch 7mcg per day      RTC 6 months     Dotty Blas MD [facility]  Interventional Cardiology  Endovascular Interventions  [email or web address]    **Disclaimer: This note was dictated by speech recognition, and every effort has been made to prevent any error in transcription, however minor errors may be present**

## 2024-08-27 ENCOUNTER — TELEPHONE (OUTPATIENT)
Dept: CARDIOLOGY | Facility: CLINIC | Age: 54
End: 2024-08-27
Payer: COMMERCIAL

## 2024-08-27 RX ORDER — NICOTINE 7MG/24HR
1 PATCH, TRANSDERMAL 24 HOURS TRANSDERMAL EVERY 24 HOURS
Qty: 56 PATCH | Refills: 0 | Status: SHIPPED | OUTPATIENT
Start: 2024-08-27 | End: 2024-10-22

## 2024-08-27 NOTE — TELEPHONE ENCOUNTER
Patient called and LM stating the Xarelto was not at the pharmacy for . Upon review of patient's chart, no order's placed at yesterdays visit. Patient was to have Xarelto, [facility]/TBI and Aortic Iliac Duplex, and a Nicotine patch script. Routed to Gal Rivas RN

## 2024-08-27 NOTE — TELEPHONE ENCOUNTER
Orders were all placed and pended during OV with Dr. Suman Blas MD [facility] yesterday in live time. Will message provider to request signing at this time. Thank you.

## 2024-09-03 ENCOUNTER — TELEPHONE (OUTPATIENT)
Dept: CARDIOLOGY | Facility: CLINIC | Age: 54
End: 2024-09-03
Payer: COMMERCIAL

## 2024-09-03 DIAGNOSIS — I70.222 ATHEROSCLEROSIS OF NATIVE ARTERY OF LEFT LOWER EXTREMITY WITH REST PAIN (MULTI): ICD-10-CM

## 2024-09-03 DIAGNOSIS — I73.9 PAD (PERIPHERAL ARTERY DISEASE) (CMS-HCC): ICD-10-CM

## 2024-09-03 NOTE — TELEPHONE ENCOUNTER
Prior authorization approved  Payer: [facility] - Commercial Case ID: [id number]  Note from payer: CaseId:[id number];Status:Approved;Review Type:Prior Auth;Coverage Start Date:08/04/2024;Coverage End Date:09/03/2025;  Approval Details    Authorized from August 4, 2024 to September 3, 2025

## 2024-09-03 NOTE — TELEPHONE ENCOUNTER
Initiated thru [facility] at this time.   Questions answered. Awaiting response.     Denial originally prompted as insurance request Plavix.   I did update notes on prior auth when I answered questions to reflect that Dr. Suman Blas MD [facility] is aware patient is on Plavix for CAD management, she is asking for Xarelto therapy low dose to be approved for PAD management.

## 2024-09-16 DIAGNOSIS — I73.9 PAD (PERIPHERAL ARTERY DISEASE) (CMS-HCC): ICD-10-CM

## 2024-09-16 NOTE — TELEPHONE ENCOUNTER
Received request for prescription refill for patient.  Patient follows with Dr. Suman Blas MD, [facility], [facility], [facility]     Request is for atorvastatin  Is patient currently on medication- yes    Last OV- 8/26/24  Next OV- not scheduled at this time.    IVAN Nobles CLERICAL-  Please schedule patient's 6 month follow up for January and route to Gal Rivas RN for refill completion.

## 2024-09-17 RX ORDER — ATORVASTATIN CALCIUM 80 MG/1
80 TABLET, FILM COATED ORAL DAILY
Qty: 90 TABLET | Refills: 3 | Status: SHIPPED | OUTPATIENT
Start: 2024-09-17 | End: 2025-09-17

## 2024-11-08 ENCOUNTER — OFFICE VISIT (OUTPATIENT)
Dept: OBGYN CLINIC | Age: 54
End: 2024-11-08
Payer: COMMERCIAL

## 2024-11-08 VITALS
DIASTOLIC BLOOD PRESSURE: 72 MMHG | HEART RATE: 72 BPM | HEIGHT: 63 IN | BODY MASS INDEX: 24.8 KG/M2 | WEIGHT: 140 LBS | SYSTOLIC BLOOD PRESSURE: 108 MMHG

## 2024-11-08 DIAGNOSIS — N85.2 BULKY OR ENLARGED UTERUS: ICD-10-CM

## 2024-11-08 DIAGNOSIS — Z01.419 WELL WOMAN EXAM WITH ROUTINE GYNECOLOGICAL EXAM: ICD-10-CM

## 2024-11-08 DIAGNOSIS — Z12.31 VISIT FOR SCREENING MAMMOGRAM: ICD-10-CM

## 2024-11-08 DIAGNOSIS — Z01.419 WELL WOMAN EXAM WITH ROUTINE GYNECOLOGICAL EXAM: Primary | ICD-10-CM

## 2024-11-08 PROCEDURE — 3078F DIAST BP <80 MM HG: CPT | Performed by: OBSTETRICS & GYNECOLOGY

## 2024-11-08 PROCEDURE — G8484 FLU IMMUNIZE NO ADMIN: HCPCS | Performed by: OBSTETRICS & GYNECOLOGY

## 2024-11-08 PROCEDURE — 99396 PREV VISIT EST AGE 40-64: CPT | Performed by: OBSTETRICS & GYNECOLOGY

## 2024-11-08 PROCEDURE — 3074F SYST BP LT 130 MM HG: CPT | Performed by: OBSTETRICS & GYNECOLOGY

## 2024-11-08 RX ORDER — CLOPIDOGREL BISULFATE 75 MG/1
75 TABLET ORAL DAILY
COMMUNITY
Start: 2024-07-13 | End: 2025-07-13

## 2024-11-08 RX ORDER — ATORVASTATIN CALCIUM 80 MG/1
80 TABLET, FILM COATED ORAL DAILY
COMMUNITY
Start: 2024-09-17 | End: 2025-09-17

## 2024-11-08 RX ORDER — RIVAROXABAN 2.5 MG/1
2.5 TABLET, FILM COATED ORAL 2 TIMES DAILY
COMMUNITY

## 2024-11-08 NOTE — PROGRESS NOTES
JHONNY DIGITAL SCREEN W OR WO CAD BILATERAL     Standing Status:   Future     Standing Expiration Date:   2026    US NON OB TRANSVAGINAL     Standing Status:   Future     Standing Expiration Date:   2025    US PELVIS COMPLETE     Standing Status:   Future     Standing Expiration Date:   2025     Order Specific Question:   Reason for exam:     Answer:   AUB    Pap Smear     Patient History:    Patient's last menstrual period was 2021 (approximate).  OBGYN Status: Postmenopausal  Past Surgical History:  2001: BREAST ENHANCEMENT SURGERY; Bilateral      Comment:  saline  No date:  SECTION  No date: OTHER SURGICAL HISTORY; Bilateral      Comment:  breast augmentation  No date: UPPP UVULOPALATOPHARYGOPLASTY  Medications/Contraceptives Affecting Cytology     Estrogen Combinations Disp Start End     PREMPRO 0.3-1.5 MG per tablet -- 2023 --    Class: Historical Med        Social History    Tobacco Use      Smoking status: Every Day        Packs/day: 1.00        Years: 1 pack/day for 30.0 years (30.0 ttl pk-yrs)        Types: Cigarettes      Smokeless tobacco: Never       Standing Status:   Future     Standing Expiration Date:   2025     Order Specific Question:   Collection Type     Answer:   Thin Prep     Order Specific Question:   Prior Abnormal Pap Test     Answer:   No     Order Specific Question:   Screening or Diagnostic     Answer:   Screening     Order Specific Question:   HPV Requested?     Answer:   Yes     Order Specific Question:   High Risk Patient     Answer:   N/A       No orders of the defined types were placed in this encounter.      Follow up:  Return in about 2 weeks (around 2024) for F/U for US results. .      Angelic Stafford MD        HEALTH MAINTENANCE:   Health information given.   Women's Health patient information and counselling done.regarding risk/benefits/alternatives to Hormone Therapy and need for yearly re-evaluation.  Periodic Pap smear

## 2024-11-13 ENCOUNTER — HOSPITAL ENCOUNTER (OUTPATIENT)
Dept: ULTRASOUND IMAGING | Age: 54
Discharge: HOME | End: 2024-11-15
Attending: OBSTETRICS & GYNECOLOGY
Payer: COMMERCIAL

## 2024-11-13 DIAGNOSIS — N85.2 BULKY OR ENLARGED UTERUS: ICD-10-CM

## 2024-11-13 PROCEDURE — 76830 TRANSVAGINAL US NON-OB: CPT

## 2024-11-13 PROCEDURE — 93975 VASCULAR STUDY: CPT

## 2024-11-13 PROCEDURE — 76856 US EXAM PELVIC COMPLETE: CPT

## 2024-11-14 RX ORDER — METOPROLOL SUCCINATE 50 MG/1
50 TABLET, EXTENDED RELEASE ORAL DAILY
Qty: 30 TABLET | Refills: 0 | Status: SHIPPED | OUTPATIENT
Start: 2024-11-14

## 2024-11-14 NOTE — TELEPHONE ENCOUNTER
30 day refill given. Patient needs OV. Attempted to call patient to schedule OV, no answer, voicemail left for patient to call back.     Requesting medication refill. Please approve or deny this request.    Rx requested:  Requested Prescriptions     Pending Prescriptions Disp Refills    metoprolol succinate (TOPROL XL) 50 MG extended release tablet [Pharmacy Med Name: METOPROLOL SUCC ER 50 MG TAB] 90 tablet 3     Sig: TAKE 1 TABLET BY MOUTH EVERY DAY         Last Office Visit:   8/21/2023      Next Visit Date:  Future Appointments   Date Time Provider Department Center   11/18/2024  2:40 PM [facility]   11/18/2024  3:45 PM Angelic Stafford MD [facility]               Last refill 10/31/2023. Please approve or deny.

## 2024-11-15 LAB
HPV HR 12 DNA SPEC QL NAA+PROBE: NOT DETECTED
HPV16 DNA SPEC QL NAA+PROBE: NOT DETECTED
HPV16+18+H RISK 12 DNA SPEC-IMP: NORMAL
HPV18 DNA SPEC QL NAA+PROBE: NOT DETECTED

## 2024-11-18 ENCOUNTER — OFFICE VISIT (OUTPATIENT)
Dept: OBGYN CLINIC | Age: 54
End: 2024-11-18
Payer: COMMERCIAL

## 2024-11-18 ENCOUNTER — HOSPITAL ENCOUNTER (OUTPATIENT)
Dept: WOMENS IMAGING | Age: 54
Discharge: HOME | End: 2024-11-20
Attending: OBSTETRICS & GYNECOLOGY
Payer: COMMERCIAL

## 2024-11-18 VITALS
BODY MASS INDEX: 24.8 KG/M2 | SYSTOLIC BLOOD PRESSURE: 100 MMHG | WEIGHT: 140 LBS | HEIGHT: 63 IN | HEART RATE: 80 BPM | DIASTOLIC BLOOD PRESSURE: 62 MMHG

## 2024-11-18 DIAGNOSIS — Z12.31 VISIT FOR SCREENING MAMMOGRAM: ICD-10-CM

## 2024-11-18 DIAGNOSIS — Z79.890 POST-MENOPAUSE ON HRT (HORMONE REPLACEMENT THERAPY): ICD-10-CM

## 2024-11-18 DIAGNOSIS — N83.8 OVARIAN MASS, RIGHT: Primary | ICD-10-CM

## 2024-11-18 DIAGNOSIS — N85.2 BULKY OR ENLARGED UTERUS: ICD-10-CM

## 2024-11-18 PROCEDURE — G8420 CALC BMI NORM PARAMETERS: HCPCS | Performed by: OBSTETRICS & GYNECOLOGY

## 2024-11-18 PROCEDURE — 3074F SYST BP LT 130 MM HG: CPT | Performed by: OBSTETRICS & GYNECOLOGY

## 2024-11-18 PROCEDURE — 4004F PT TOBACCO SCREEN RCVD TLK: CPT | Performed by: OBSTETRICS & GYNECOLOGY

## 2024-11-18 PROCEDURE — G8427 DOCREV CUR MEDS BY ELIG CLIN: HCPCS | Performed by: OBSTETRICS & GYNECOLOGY

## 2024-11-18 PROCEDURE — 3017F COLORECTAL CA SCREEN DOC REV: CPT | Performed by: OBSTETRICS & GYNECOLOGY

## 2024-11-18 PROCEDURE — G8484 FLU IMMUNIZE NO ADMIN: HCPCS | Performed by: OBSTETRICS & GYNECOLOGY

## 2024-11-18 PROCEDURE — 3078F DIAST BP <80 MM HG: CPT | Performed by: OBSTETRICS & GYNECOLOGY

## 2024-11-18 PROCEDURE — 77063 BREAST TOMOSYNTHESIS BI: CPT

## 2024-11-18 PROCEDURE — 99214 OFFICE O/P EST MOD 30 MIN: CPT | Performed by: OBSTETRICS & GYNECOLOGY

## 2024-11-18 RX ORDER — POLYETHYLENE GLYCOL 3350 17 G/17G
POWDER, FOR SOLUTION ORAL
Qty: 1020 G | Refills: 0 | Status: SHIPPED | OUTPATIENT
Start: 2024-11-18

## 2024-11-18 RX ORDER — CLINDAMYCIN PHOSPHATE 20 MG/G
CREAM VAGINAL
Qty: 1 EACH | Refills: 0 | Status: SHIPPED | OUTPATIENT
Start: 2024-11-18

## 2024-11-21 ENCOUNTER — TELEPHONE (OUTPATIENT)
Dept: OBGYN CLINIC | Age: 54
End: 2024-11-21

## 2024-12-11 NOTE — TELEPHONE ENCOUNTER
Called pt to follow up on surgery scheduling. Will need lab completed and clearance from Dr.Castro- Alva. Left message on vm for pt to call the office to get scheduled.

## 2024-12-12 RX ORDER — METOPROLOL SUCCINATE 50 MG/1
50 TABLET, EXTENDED RELEASE ORAL DAILY
Qty: 90 TABLET | Refills: 1 | OUTPATIENT
Start: 2024-12-12

## 2024-12-12 NOTE — TELEPHONE ENCOUNTER
Patient needs OV. No refills given.    Requesting medication refill. Please approve or deny this request.    Rx requested:  Requested Prescriptions     Pending Prescriptions Disp Refills    metoprolol succinate (TOPROL XL) 50 MG extended release tablet [Pharmacy Med Name: METOPROLOL SUCC ER 50 MG TAB] 90 tablet 1     Sig: TAKE 1 TABLET BY MOUTH DAILY PATIENT NEEDS AN APPOINTMENT FOR MORE REFILLS         Last Office Visit:   8/21/2023      Next Visit Date:  No future appointments.

## 2024-12-13 DIAGNOSIS — I10 ESSENTIAL HYPERTENSION: Primary | ICD-10-CM

## 2024-12-13 RX ORDER — AMLODIPINE BESYLATE 2.5 MG/1
2.5 TABLET ORAL DAILY
Qty: 90 TABLET | Refills: 0 | Status: SHIPPED | OUTPATIENT
Start: 2024-12-13

## 2024-12-13 RX ORDER — METOPROLOL SUCCINATE 50 MG/1
50 TABLET, EXTENDED RELEASE ORAL DAILY
Qty: 90 TABLET | Refills: 0 | Status: SHIPPED | OUTPATIENT
Start: 2024-12-13

## 2025-01-08 ENCOUNTER — OFFICE VISIT (OUTPATIENT)
Dept: VASCULAR SURGERY | Facility: CLINIC | Age: 55
End: 2025-01-08
Payer: COMMERCIAL

## 2025-01-08 VITALS
BODY MASS INDEX: 23.39 KG/M2 | HEART RATE: 82 BPM | WEIGHT: 132 LBS | SYSTOLIC BLOOD PRESSURE: 110 MMHG | DIASTOLIC BLOOD PRESSURE: 80 MMHG | HEIGHT: 63 IN

## 2025-01-08 DIAGNOSIS — F17.200 TOBACCO USE DISORDER: ICD-10-CM

## 2025-01-08 DIAGNOSIS — I73.9 PAD (PERIPHERAL ARTERY DISEASE) (CMS-HCC): Primary | ICD-10-CM

## 2025-01-08 PROCEDURE — 99214 OFFICE O/P EST MOD 30 MIN: CPT | Performed by: NURSE PRACTITIONER

## 2025-01-08 NOTE — PATIENT INSTRUCTIONS
Continue current medications as ordered.   Continue to walk as tolerated.   Continue efforts to quit smoking.   Schedule Arterial Duplex and [facility]. We will call you with results.   Schedule 6 month follow up.

## 2025-01-08 NOTE — PROGRESS NOTES
"Subjective   Montserrat Hernandes is a 54 y.o. female.    Chief Complaint:  55yo patient of Dr. Suman Kumar for 6 month follow up for PAD.     HPI  Most recent visit 24, stopped Aspirin and started Xarelto 2.5mg BID. Encouraged smoking cessation referral for counseling. Surveillance duplex w/[facility] in 6 months.     PMHx: HTN, HLD, OA. MARCO, PAD, Raynaud's Syndrome  PSHx: Breast Surgery,   Social Hx: Current smoker 2-3 cigarettes/day.     Presents unaccompanied today. Denies abdominal pain and/or back pain. Intermittent bilateral calf pain/cramps at rest. Denies bilateral lower extremity pain with exertion.  Walking 2-2.5 miles with spouse 2-3 times/week. Denies bilateral lower extremity paresthesias, weakness, or fatigue. Denies edema. Denies any active lesions or ulcers with drainage. No wound care or dressings at this time. Denies varicosities. Denies any discoloration, erythema or palor. Medication adherent.     Review of Systems   HENT:  Negative for nosebleeds.    Cardiovascular:  Negative for claudication and leg swelling.   Hematologic/Lymphatic: Negative for bleeding problem. Does not bruise/bleed easily.   Skin:  Negative for color change and poor wound healing.   Musculoskeletal:  Negative for back pain.   Gastrointestinal:  Negative for abdominal pain, hematochezia and melena.   Genitourinary:  Negative for hematuria.   Neurological:  Negative for numbness and paresthesias.     Objective   Visit Vitals  /80 (BP Location: Left arm, Patient Position: Sitting)   Pulse 82   Ht 1.6 m (5' 3\")   Wt 59.9 kg (132 lb)   BMI 23.38 kg/m²   Smoking Status Every Day   BSA 1.63 m²     Vitals reviewed.   Constitutional:       Appearance: Healthy appearance.   Neck:      Vascular: No carotid bruit.   Pulmonary:      Effort: Pulmonary effort is normal.      Breath sounds: Normal breath sounds.   Cardiovascular:      Normal rate. Regular rhythm.      Murmurs: There is no murmur.      No gallop.  No " click. No rub.   Pulses:     Femoral: 1+ bilaterally.     Dorsalis pedis: 1+ bilaterally.     Posterior tibial: 1+ bilaterally.  Edema:     Peripheral edema absent.   Skin:     General: Skin is warm and dry.   Feet:      Right foot:      Skin integrity: No skin breakdown.      Left foot:      Skin integrity: No skin breakdown.   Neurological:      Mental Status: Oriented to person, place and time.       Lab Review:   Lab Results   Component Value Date     07/12/2024    K 4.0 07/12/2024     (H) 07/12/2024    CO2 20 (L) 07/12/2024    BUN 7 07/12/2024    CREATININE 0.81 07/12/2024    GLUCOSE 100 (H) 07/12/2024    CALCIUM 9.1 07/12/2024     Lab Results   Component Value Date    WBC 7.4 07/11/2024    HGB 14.1 07/11/2024    HCT 43.7 07/11/2024    MCV 98 07/11/2024     07/11/2024     Lab Results   Component Value Date    CHOL 227 (H) 07/12/2024    TRIG 123 07/12/2024    HDL 38.7 07/12/2024     Diagnostic Testing:   VASC US ANKLE BRACHIAL INDEX ([facility]) WITHOUT EXERCISE     Patient Name:      ELIU Wiley Physician: [id number] Pilar Malagon MD  Study Date:        8/13/2024           Ordering Provider: [id number] COMFORT FIELD  MRN/PID:           [id number]            Fellow:  Accession#:        [id number]        Technologist:      Elise Kincaid  Date of Birth/Age: 1970 / 54 years Technologist 2:  Gender:            F                   Encounter#:        [id number]  Admission Status:  Outpatient          Location           [facility]                                         Performed:        Diagnosis/ICD: Other specified peripheral vascular diseases-I73.89        Smoker:            Former.  Pertinent History: Claudication, Hyperlipidemia, HTN, Leg pain and Vascular                     surgery. Recent Lt common iliac stent placed                     Patient reports pain is intermittent and minimal on the left                      side                     Less discoloration is seen on left digits.        CONCLUSIONS:  Right Lower PVR: Evidence of mild arterial occlusive disease in the right lower extremity at rest. Decreased digital perfusion noted. Multiphasic flow is noted in the right common femoral artery, right posterior tibial artery and right dorsalis pedis artery.  Left Lower PVR: No evidence of arterial occlusive disease in the left lower extremity at rest. Normal digital perfusion noted. Multiphasic flow is noted in the left common femoral artery, left posterior tibial artery and left dorsalis pedis artery.     Comparison:  Compared with study from 7/3/2024, improvement in left ankle brachial index; right is slightly decreased.     Imaging & Doppler Findings:     RIGHT Lower PVR                Pressures Ratios  Right Posterior Tibial (Ankle) 120 mmHg  0.88  Right Dorsalis Pedis (Ankle)   115 mmHg  0.84  Right Digit (Great Toe)        67 mmHg   0.49           LEFT Lower PVR                Pressures Ratios  Left Posterior Tibial (Ankle) 126 mmHg  0.92  Left Dorsalis Pedis (Ankle)   124 mmHg  0.91  Left Digit (Great Toe)        91 mmHg   0.66                              Right     Left  Brachial Pressure 137 mmHg 127 mmHg           [id number] Pilar Malagon MD  Electronically signed by [id number] Pilar Malagon MD on 8/16/2024 at 1:47:20 PM    Current Outpatient Medications:     amLODIPine (Norvasc) 2.5 mg tablet, Take 1 tablet (2.5 mg) by mouth once daily., Disp: 90 tablet, Rfl: 0    atorvastatin (Lipitor) 80 mg tablet, Take 1 tablet (80 mg) by mouth once daily., Disp: 90 tablet, Rfl: 3    cholecalciferol (Vitamin D-3) 50 MCG (2000 UT) tablet, Take 1 tablet (2,000 Units) by mouth once daily with breakfast., Disp: , Rfl:     clopidogrel (Plavix) 75 mg tablet, Take 1 tablet (75 mg) by mouth once daily for 365 doses. Do not fill before July 13, 2024., Disp: 30 tablet, Rfl: 11    estradiol (Estrace) 0.01 % (0.1 mg/gram) vaginal cream, Insert 0.25  Applicatorfuls (1 g) into the vagina 2 times a week., Disp: , Rfl:     ibuprofen 800 mg tablet, Take 1 tablet (800 mg) by mouth if needed., Disp: , Rfl:     metoprolol succinate XL (Toprol-XL) 50 mg 24 hr tablet, Take 1 tablet (50 mg) by mouth once daily., Disp: 90 tablet, Rfl: 0    nicotine (Nicoderm CQ) 7 mg/24 hr patch, Place 1 patch over 24 hours on the skin once every 24 hours., Disp: 56 patch, Rfl: 0    Prempro 0.3-1.5 mg tablet, Take 1 tablet by mouth once daily., Disp: , Rfl:     rivaroxaban (Xarelto) 2.5 mg tablet, Take 1 tablet (2.5 mg) by mouth 2 times a day., Disp: 180 tablet, Rfl: 3    Assessment/Plan   PAD s/p LLE angiogram w/thrombectomy of Left DAKOTA w/[facility] (7/12/24)  Denies abdominal pain and/or back pain. Intermittent bilateral calf pain/cramps at rest. Denies bilateral lower extremity pain with exertion.  Walking 2-2.5 miles with spouse 2-3 times/week. Denies bilateral lower extremity paresthesias, weakness, or fatigue. Denies edema. Denies any active lesions or ulcers with drainage. No wound care or dressings at this time. Denies varicosities. Denies any discoloration, erythema or palor.   Palpable bilateral +1 femoral/DP/PT pulses. No wounds/discoloration.   Vascular US [facility] w/o Exercise 8/13/24 Right Lower PVR: Evidence of mild arterial occlusive disease in the right lower extremity at rest. Decreased digital perfusion noted. Multiphasic flow is noted in the right common femoral artery, right posterior tibial artery and right dorsalis pedis artery.  Left Lower PVR: No evidence of arterial occlusive disease in the left lower extremity at rest. Normal digital perfusion noted. Multiphasic flow is noted in the left common femoral artery, left posterior tibial artery and left dorsalis pedis artery.  Continue medical management w/Plavix 75mg every day, Xarelto 2.5mg BID, and Atorvastatin 80mg at bedtime.   Continue walking as tolerated.   Schedule US aorta iliac duplex complete and [facility] now, plan for  surveillance and follow up in 6 months depending on results.      Nicotine Dependence   Current smoker, smoking 2-3 cigarettes/day. Previously referred to Tobacco Cessation Counseling.

## 2025-01-16 ASSESSMENT — ENCOUNTER SYMPTOMS
NUMBNESS: 0
HEMATURIA: 0
ABDOMINAL PAIN: 0
COLOR CHANGE: 0
BRUISES/BLEEDS EASILY: 0
BACK PAIN: 0
PARESTHESIAS: 0
HEMATOCHEZIA: 0
POOR WOUND HEALING: 0
CLAUDICATION: 0

## 2025-02-28 ENCOUNTER — HOSPITAL ENCOUNTER (OUTPATIENT)
Dept: RADIOLOGY | Facility: HOSPITAL | Age: 55
Discharge: HOME | End: 2025-02-28
Payer: COMMERCIAL

## 2025-02-28 DIAGNOSIS — Z95.820 S/P PERCUTANEOUS TRANSLUMINAL ANGIOPLASTY (PTA) WITH STENT PLACEMENT: ICD-10-CM

## 2025-02-28 DIAGNOSIS — I70.222 ATHEROSCLEROSIS OF NATIVE ARTERY OF LEFT LOWER EXTREMITY WITH REST PAIN (MULTI): ICD-10-CM

## 2025-02-28 DIAGNOSIS — I73.9 PAD (PERIPHERAL ARTERY DISEASE) (CMS-HCC): ICD-10-CM

## 2025-02-28 PROCEDURE — 93922 UPR/L XTREMITY ART 2 LEVELS: CPT

## 2025-02-28 PROCEDURE — 93978 VASCULAR STUDY: CPT

## 2025-06-27 DIAGNOSIS — I10 ESSENTIAL HYPERTENSION: ICD-10-CM

## 2025-06-27 DIAGNOSIS — I70.222 ATHEROSCLEROSIS OF NATIVE ARTERY OF LEFT LOWER EXTREMITY WITH REST PAIN: ICD-10-CM

## 2025-06-27 DIAGNOSIS — I73.9 PAD (PERIPHERAL ARTERY DISEASE): ICD-10-CM

## 2025-06-30 DIAGNOSIS — I70.222 ATHEROSCLEROSIS OF NATIVE ARTERY OF LEFT LOWER EXTREMITY WITH REST PAIN: ICD-10-CM

## 2025-06-30 DIAGNOSIS — I10 ESSENTIAL HYPERTENSION: ICD-10-CM

## 2025-06-30 DIAGNOSIS — I73.9 PAD (PERIPHERAL ARTERY DISEASE): ICD-10-CM

## 2025-07-01 RX ORDER — METOPROLOL SUCCINATE 50 MG/1
50 TABLET, EXTENDED RELEASE ORAL DAILY
Qty: 90 TABLET | Refills: 0 | Status: SHIPPED | OUTPATIENT
Start: 2025-07-01

## 2025-07-01 RX ORDER — AMLODIPINE BESYLATE 2.5 MG/1
2.5 TABLET ORAL DAILY
Qty: 90 TABLET | Refills: 0 | Status: SHIPPED | OUTPATIENT
Start: 2025-07-01

## 2025-07-01 RX ORDER — RIVAROXABAN 2.5 MG/1
2.5 TABLET, FILM COATED ORAL 2 TIMES DAILY
Qty: 180 TABLET | Refills: 3 | Status: SHIPPED | OUTPATIENT
Start: 2025-07-01 | End: 2026-07-01

## 2025-07-08 ENCOUNTER — OFFICE VISIT (OUTPATIENT)
Dept: VASCULAR SURGERY | Facility: CLINIC | Age: 55
End: 2025-07-08
Payer: COMMERCIAL

## 2025-07-08 VITALS
HEART RATE: 63 BPM | SYSTOLIC BLOOD PRESSURE: 120 MMHG | RESPIRATION RATE: 16 BRPM | HEIGHT: 63 IN | BODY MASS INDEX: 23.92 KG/M2 | OXYGEN SATURATION: 97 % | DIASTOLIC BLOOD PRESSURE: 58 MMHG | WEIGHT: 135 LBS

## 2025-07-08 DIAGNOSIS — I73.9 PAD (PERIPHERAL ARTERY DISEASE): Primary | ICD-10-CM

## 2025-07-08 DIAGNOSIS — F17.200 TOBACCO USE DISORDER: ICD-10-CM

## 2025-07-08 PROCEDURE — 99214 OFFICE O/P EST MOD 30 MIN: CPT | Performed by: NURSE PRACTITIONER

## 2025-07-08 RX ORDER — CLOPIDOGREL BISULFATE 75 MG/1
75 TABLET ORAL DAILY
Qty: 90 TABLET | Refills: 3 | Status: SHIPPED | OUTPATIENT
Start: 2025-07-08 | End: 2025-07-10

## 2025-07-08 ASSESSMENT — ENCOUNTER SYMPTOMS
BACK PAIN: 0
HEMATOCHEZIA: 0
ABDOMINAL PAIN: 0
PARESTHESIAS: 0
NUMBNESS: 0
BRUISES/BLEEDS EASILY: 0
POOR WOUND HEALING: 0
CLAUDICATION: 1
COLOR CHANGE: 0
HEMATURIA: 0

## 2025-07-08 NOTE — PROGRESS NOTES
"Subjective   Montserrat Hernandes is a 55 y.o. female.    Chief Complaint:  54yo patient of Dr. Suman Kumar for 6 month follow up for PAD.     HPI  Most recent visit 2025, denied claudication. Endorses cramping at night. US aorta iliac duplex & [facility]. Current smoker.     Interval Hx: [facility] PCP referred for Carotid US which showed no significant stenosis.     PMHx: HTN, HLD, OA. MARCO, PAD, Raynaud's Syndrome  PSHx: Breast Surgery,   Social Hx: Current smoker 2-3 cigarettes/day.      Presents unaccompanied today. Denies abdominal pain and/or back pain. Intermittent bilateral calf pain/cramps at night. Endorses cramps in bilateral calves when walking uphill.  Denies bilateral lower extremity pain with walking 2-2.5 miles flat/downhill with spouse 4-5 times/week. Denies bilateral lower extremity paresthesias, weakness, or fatigue. Denies edema. Denies any active lesions or ulcers with drainage. No wound care or dressings at this time. Denies varicosities. Denies any discoloration, erythema or palor. Medication adherent.     Review of Systems   HENT:  Negative for nosebleeds.    Cardiovascular:  Positive for claudication. Negative for cyanosis and leg swelling.   Hematologic/Lymphatic: Negative for bleeding problem. Does not bruise/bleed easily.   Skin:  Negative for color change and poor wound healing.   Musculoskeletal:  Negative for back pain.   Gastrointestinal:  Negative for abdominal pain, hematochezia and melena.   Genitourinary:  Negative for hematuria.   Neurological:  Negative for numbness and paresthesias.     Objective   Visit Vitals  /58 (BP Location: Left arm, Patient Position: Sitting, BP Cuff Size: Adult)   Pulse 63   Resp 16   Ht 1.6 m (5' 3\")   Wt 61.2 kg (135 lb)   SpO2 97%   BMI 23.91 kg/m²   Smoking Status Every Day   BSA 1.65 m²     Vitals reviewed.   Constitutional:       General: Awake.      Appearance: Normal and healthy appearance. Normal weight.   Neck:      Vascular: No " carotid bruit.   Pulmonary:      Effort: Pulmonary effort is normal.      Breath sounds: Normal breath sounds.   Cardiovascular:      Normal rate. Regular rhythm.      Murmurs: There is no murmur.      No gallop.  No click. No rub.   Pulses:     Femoral: 1+ bilaterally.     Dorsalis pedis: 1+ bilaterally.  Edema:     Peripheral edema absent.   Skin:     General: Skin is warm and dry.   Feet:      Right foot:      Skin integrity: No ulcer, blister, skin breakdown, erythema or warmth.      Left foot:      Skin integrity: No ulcer, blister, skin breakdown, erythema or warmth.   Neurological:      Mental Status: Alert and oriented to person, place and time.   Psychiatric:         Behavior: Behavior is cooperative.     Lab Review:   Lab Results   Component Value Date     07/12/2024    K 4.0 07/12/2024     (H) 07/12/2024    CO2 20 (L) 07/12/2024    BUN 7 07/12/2024    CREATININE 0.81 07/12/2024    GLUCOSE 100 (H) 07/12/2024    CALCIUM 9.1 07/12/2024     Lab Results   Component Value Date    WBC 7.4 07/11/2024    HGB 14.1 07/11/2024    HCT 43.7 07/11/2024    MCV 98 07/11/2024     07/11/2024     Lab Results   Component Value Date    CHOL 227 (H) 07/12/2024    TRIG 123 07/12/2024    HDL 38.7 07/12/2024     Diagnostic Imaging:  VASC US ANKLE BRACHIAL INDEX ([facility]) WITHOUT EXERCISE     Patient Name:      ELIU Wiley Physician:  [id number] Kumar MD  Study Date:        2/28/2025           Ordering Provider:  [id number] ACOSTA  MRN/PID:           [id number]            Fellow:  Accession#:        [id number]        Technologist:       Mona García RDMS,                                                             T  Date of Birth/Age: 1970 / 54 years Technologist 2:  Gender:            F                   Encounter#:         [id number]  Admission  Status:  Outpatient          Location Performed: [facility]        Diagnosis/ICD: Peripheral vascular disease, unspecified-I73.9  CPT Codes:     83025 Peripheral artery [facility] Only        Pertinent History: PVD and Vascular surgery. F/U Lt Iliac stent.        CONCLUSIONS:  Right Lower PVR: No evidence of arterial occlusive disease in the right lower extremity at rest. Normal digital perfusion noted. Multiphasic flow is noted in the right common femoral artery, right dorsalis pedis artery and right posterior tibial artery.  Left Lower PVR: No evidence of arterial occlusive disease in the left lower extremity at rest. Normal digital perfusion noted. Multiphasic flow is noted in the left common femoral artery, left posterior tibial artery and left dorsalis pedis artery.     Imaging & Doppler Findings:     RIGHT Lower PVR                Pressures Ratios  Right Posterior Tibial (Ankle) 139 mmHg  1.10  Right Dorsalis Pedis (Ankle)   135 mmHg  1.07  Right Digit (Great Toe)        88 mmHg   0.70           LEFT Lower PVR                Pressures Ratios  Left Posterior Tibial (Ankle) 134 mmHg  1.06  Left Dorsalis Pedis (Ankle)   134 mmHg  1.06  Left Digit (Great Toe)        94 mmHg   0.75                              Right     Left  Brachial Pressure 126 mmHg 116 mmHg           [id number] Ania Kumar MD  Electronically signed by [id number] Ania Kumar MD on 3/3/2025 at 4:40:21 PM    STUDY:  [facility] US AORTA ILIAC DUPLEX COMPLETE;  2/28/2025 10:00 am      INDICATION:  Signs/Symptoms:post intervention.      ,I73.9 Peripheral vascular disease, unspecified (CMS-HCC),I70.222  Atherosclerosis of native arteries of extremities with rest pain,  left leg (Multi),Z95.820 Peripheral vascular angioplasty status with  implants and grafts      COMPARISON:  CT abdomen pelvis dated 07/23/2024      ACCESSION NUMBER(S):  [id number]      ORDERING CLINICIAN:  ANIA ACOSTA      TECHNIQUE:  Transabdominal imaging  of the abdominal aorta was performed.  Grayscale, color, and spectral Doppler were performed.      FINDINGS:  The proximal abdominal aorta measures  2.0 x 1.8 cm in maximum  diameter. The mid abdominal aorta measures 2.3 x 1.7 cm in maximum  diameter. The distal abdominal aorta measures  1.6 x 1.1cm in maximum  diameter. There is no evidence of an abdominal aortic aneurysm.      The visualized portions of the right common iliac artery measures  0.7 x 0.6 cm.      The visualized portions of the left common iliac artery measures  0.8  x 0.8 cm. Left iliac stent is in place. Stent lumen is patent.      Incidental note is made of a large cystic pelvic mass measuring up to  15 cm in size, also noted on the previous CT of 07/03/2024      IMPRESSION:  No evidence of an abdominal aortic aneurysm.      MACRO:  None.      Signed by: Aldo Shoemaker 2/28/2025 10:28 AM  Dictation workstation:   [id number]  Current Medications[1]    Assessment/Plan   PAD s/p LLE angiogram w/thrombectomy of Left DAKOTA w/STACY (7/12/24)   Denies abdominal pain and/or back pain. Intermittent bilateral calf pain/cramps at night. Endorses cramps in bilateral calves when walking uphill.  Denies bilateral lower extremity pain with walking 2-2.5 miles flat/downhill with spouse 4-5 times/week. Denies bilateral lower extremity paresthesias, weakness, or fatigue. Denies edema. Denies any active lesions or ulcers with drainage. Denies any discoloration, erythema or palor.   Palpable bilateral +1 femoral/DP/PT pulses. No wounds/discoloration.   US aorta iliac duplex complete  2/28/25 left iliac stent is patent.   [facility] 2/28/25 no evidence of artial occlusvie disease.   [facility] in 6 months for postprocedure surveillance.   Continue medical management w/Plavix 75mg every day (eRx sent to [facility] per patient request via [facility]), Xarelto 2.5mg BID, and Atorvastatin 80mg at bedtime.   Continue walking as tolerated.     Nicotine Dependence   Current smoker, smoking  2-3 cigarettes/day. Previously referred to Tobacco Cessation Counseling.        [1]   Current Outpatient Medications:     amLODIPine (Norvasc) 2.5 mg tablet, Take 1 tablet (2.5 mg) by mouth once daily., Disp: 90 tablet, Rfl: 0    atorvastatin (Lipitor) 80 mg tablet, Take 1 tablet (80 mg) by mouth once daily., Disp: 90 tablet, Rfl: 3    cholecalciferol (Vitamin D-3) 50 MCG (2000 UT) tablet, Take 1 tablet (2,000 Units) by mouth once daily with breakfast., Disp: , Rfl:     clopidogrel (Plavix) 75 mg tablet, Take 1 tablet (75 mg) by mouth once daily for 365 doses. Do not fill before July 13, 2024., Disp: 30 tablet, Rfl: 11    metoprolol succinate XL (Toprol-XL) 50 mg 24 hr tablet, Take 1 tablet (50 mg) by mouth once daily., Disp: 90 tablet, Rfl: 0    Prempro 0.3-1.5 mg tablet, Take 1 tablet by mouth once daily., Disp: , Rfl:     rivaroxaban (Xarelto) 2.5 mg tablet, Take 1 tablet (2.5 mg) by mouth 2 times a day., Disp: 180 tablet, Rfl: 3    estradiol (Estrace) 0.01 % (0.1 mg/gram) vaginal cream, Insert 0.25 Applicatorfuls (1 g) into the vagina 2 times a week. (Patient not taking: Reported on 7/8/2025), Disp: , Rfl:     ibuprofen 800 mg tablet, Take 1 tablet (800 mg) by mouth if needed. (Patient not taking: Reported on 7/8/2025), Disp: , Rfl:     nicotine (Nicoderm CQ) 7 mg/24 hr patch, Place 1 patch over 24 hours on the skin once every 24 hours. (Patient not taking: Reported on 7/8/2025), Disp: 56 patch, Rfl: 0

## 2025-07-08 NOTE — PATIENT INSTRUCTIONS
Continue medications as ordered. Please let us know if you decide to switch your pharmacy.   Schedule [facility] testing we will call you with results.   Continue efforts to quit smoking.   Continue walking as instructed.   Schedule follow up in 6 months.

## 2025-07-10 RX ORDER — CLOPIDOGREL BISULFATE 75 MG/1
75 TABLET ORAL DAILY
Qty: 90 TABLET | Refills: 3 | Status: SHIPPED | OUTPATIENT
Start: 2025-07-10 | End: 2026-07-10

## 2025-08-12 ENCOUNTER — HOSPITAL ENCOUNTER (OUTPATIENT)
Dept: RADIOLOGY | Facility: HOSPITAL | Age: 55
Discharge: HOME | End: 2025-08-12
Payer: COMMERCIAL

## 2025-08-12 DIAGNOSIS — I73.9 PAD (PERIPHERAL ARTERY DISEASE): ICD-10-CM

## 2025-08-12 PROCEDURE — 93922 UPR/L XTREMITY ART 2 LEVELS: CPT

## 2025-09-29 ENCOUNTER — APPOINTMENT (OUTPATIENT)
Dept: OBSTETRICS AND GYNECOLOGY | Facility: CLINIC | Age: 55
End: 2025-09-29
Payer: COMMERCIAL

## (undated) DEVICE — GUIDE WIRE, 260CM, HI-TORQUE, VERSACORE, MODIFIED J

## (undated) DEVICE — CATHETER, BALLOON, EVERCROSS, 8MM X 40MM X 80CM, OTW PTA

## (undated) DEVICE — GUIDEWIRE, COMMAND ST, HI-TORQUE, 0.18 X 300CM

## (undated) DEVICE — SHEATH, PINNACLE, W/.038 GUIDEWIRE, 10 CM,  7FR INTRODUCER, 7FR DIA, 2.5 CM DIALATOR

## (undated) DEVICE — CANISTER, INDIGO MAX, FOR PMXENGN, NON-STERILE

## (undated) DEVICE — CLOSURE SYSTEM, VASCULAR, VASCADE, 5 F

## (undated) DEVICE — CATHETER, QUICKCROSS SUPPORT .035 X 90CM

## (undated) DEVICE — CATHETER, CAT 7 130 TORQ, PLUS LIGHTNING, NO CE

## (undated) DEVICE — ACCESS KIT, S-MAK MINI, 5FR 10CM 0.018IN 40CM, SS/SS, ECHO ENHANCE NEEDLE

## (undated) DEVICE — SHEATH, PINNACLE, W/.038 GW 10CM, 5FR INTRODUCER, 2.5 CM DIALATOR

## (undated) DEVICE — CATHETER, ANGIOGRAPHIC, OMNI-FLUSH

## (undated) DEVICE — CATHETER, VISIONS PV .018 (IVUS)

## (undated) DEVICE — CLOSURE SYSTEM, VASCULAR, VASCADE 6/7F VCS

## (undated) DEVICE — CATHETER, BALLOON, EVERCROSS, 7MM X 40MM X 80CM, OTW PTA

## (undated) DEVICE — TUBING, MANIFOLD, LOW PRESSURE

## (undated) DEVICE — INFLATION DEVICE, COPILOT VALVE AND 20/30 INDEFLATOR

## (undated) DEVICE — SHEATH, BRITE TIP 7 X 35CM

## (undated) DEVICE — SYRINGE, ANGIOGRAPHIC, MULTIDOSE

## (undated) DEVICE — SHEATH, GLIDESHEATH, SLENDER, 7FR 10CM